# Patient Record
Sex: FEMALE | Race: WHITE | Employment: OTHER | URBAN - METROPOLITAN AREA
[De-identification: names, ages, dates, MRNs, and addresses within clinical notes are randomized per-mention and may not be internally consistent; named-entity substitution may affect disease eponyms.]

---

## 2018-08-16 ENCOUNTER — HOSPITAL ENCOUNTER (OUTPATIENT)
Age: 83
Discharge: HOME | End: 2018-08-18
Payer: MEDICARE

## 2018-08-16 LAB
BASOPHILS ABSOLUTE: 0.08 E9/L (ref 0–0.2)
BASOPHILS RELATIVE PERCENT: 1 % (ref 0–2)
EOSINOPHILS ABSOLUTE: 0.11 E9/L (ref 0.05–0.5)
EOSINOPHILS RELATIVE PERCENT: 1.3 % (ref 0–6)
FOLATE: 11.2 NG/ML (ref 4.8–24.2)
HCT VFR BLD CALC: 44.7 % (ref 34–48)
HEMOGLOBIN: 14.1 G/DL (ref 11.5–15.5)
IMMATURE GRANULOCYTES #: 0.02 E9/L
IMMATURE GRANULOCYTES %: 0.2 % (ref 0–5)
LYMPHOCYTES ABSOLUTE: 2.82 E9/L (ref 1.5–4)
LYMPHOCYTES RELATIVE PERCENT: 33.9 % (ref 20–42)
MCH RBC QN AUTO: 30.5 PG (ref 26–35)
MCHC RBC AUTO-ENTMCNC: 31.5 % (ref 32–34.5)
MCV RBC AUTO: 96.5 FL (ref 80–99.9)
MONOCYTES ABSOLUTE: 0.61 E9/L (ref 0.1–0.95)
MONOCYTES RELATIVE PERCENT: 7.3 % (ref 2–12)
NEUTROPHILS ABSOLUTE: 4.67 E9/L (ref 1.8–7.3)
NEUTROPHILS RELATIVE PERCENT: 56.3 % (ref 43–80)
PDW BLD-RTO: 12.6 FL (ref 11.5–15)
PLATELET # BLD: 239 E9/L (ref 130–450)
PMV BLD AUTO: 10.6 FL (ref 7–12)
RBC # BLD: 4.63 E12/L (ref 3.5–5.5)
T4 FREE: 1.4 NG/DL (ref 0.93–1.7)
TSH SERPL DL<=0.05 MIU/L-ACNC: 1.16 UIU/ML (ref 0.27–4.2)
VITAMIN B-12: 681 PG/ML (ref 211–946)
WBC # BLD: 8.3 E9/L (ref 4.5–11.5)

## 2018-08-16 PROCEDURE — 84439 ASSAY OF FREE THYROXINE: CPT

## 2018-08-16 PROCEDURE — 80061 LIPID PANEL: CPT

## 2018-08-16 PROCEDURE — 85025 COMPLETE CBC W/AUTO DIFF WBC: CPT

## 2018-08-16 PROCEDURE — 82746 ASSAY OF FOLIC ACID SERUM: CPT

## 2018-08-16 PROCEDURE — 84443 ASSAY THYROID STIM HORMONE: CPT

## 2018-08-16 PROCEDURE — 80053 COMPREHEN METABOLIC PANEL: CPT

## 2018-08-16 PROCEDURE — 82306 VITAMIN D 25 HYDROXY: CPT

## 2018-08-16 PROCEDURE — 82607 VITAMIN B-12: CPT

## 2018-08-17 LAB
ALBUMIN SERPL-MCNC: 4.4 G/DL (ref 3.5–5.2)
ALP BLD-CCNC: 68 U/L (ref 35–104)
ALT SERPL-CCNC: 9 U/L (ref 0–32)
ANION GAP SERPL CALCULATED.3IONS-SCNC: 15 MMOL/L (ref 7–16)
AST SERPL-CCNC: 25 U/L (ref 0–31)
BILIRUB SERPL-MCNC: 0.8 MG/DL (ref 0–1.2)
BUN BLDV-MCNC: 12 MG/DL (ref 8–23)
CALCIUM SERPL-MCNC: 9.6 MG/DL (ref 8.6–10.2)
CHLORIDE BLD-SCNC: 94 MMOL/L (ref 98–107)
CHOLESTEROL, TOTAL: 166 MG/DL (ref 0–199)
CO2: 32 MMOL/L (ref 22–29)
CREAT SERPL-MCNC: 0.8 MG/DL (ref 0.5–1)
GFR AFRICAN AMERICAN: >60
GFR NON-AFRICAN AMERICAN: >60 ML/MIN/1.73
GLUCOSE BLD-MCNC: 86 MG/DL (ref 74–109)
HDLC SERPL-MCNC: 60 MG/DL
LDL CHOLESTEROL CALCULATED: 78 MG/DL (ref 0–99)
POTASSIUM SERPL-SCNC: 4.4 MMOL/L (ref 3.5–5)
SODIUM BLD-SCNC: 141 MMOL/L (ref 132–146)
TOTAL PROTEIN: 7.4 G/DL (ref 6.4–8.3)
TRIGL SERPL-MCNC: 139 MG/DL (ref 0–149)
VITAMIN D 25-HYDROXY: 28 NG/ML (ref 30–100)
VLDLC SERPL CALC-MCNC: 28 MG/DL

## 2018-09-05 ENCOUNTER — ANESTHESIA (OUTPATIENT)
Dept: OPERATING ROOM | Age: 83
DRG: 470 | End: 2018-09-05
Payer: MEDICARE

## 2018-09-05 ENCOUNTER — APPOINTMENT (OUTPATIENT)
Dept: GENERAL RADIOLOGY | Age: 83
DRG: 470 | End: 2018-09-05
Payer: MEDICARE

## 2018-09-05 ENCOUNTER — ANESTHESIA EVENT (OUTPATIENT)
Dept: OPERATING ROOM | Age: 83
DRG: 470 | End: 2018-09-05
Payer: MEDICARE

## 2018-09-05 ENCOUNTER — HOSPITAL ENCOUNTER (INPATIENT)
Age: 83
LOS: 5 days | Discharge: NURSING, REHAB OR LONG-TERM CARE | DRG: 470 | End: 2018-09-10
Attending: EMERGENCY MEDICINE | Admitting: INTERNAL MEDICINE
Payer: MEDICARE

## 2018-09-05 DIAGNOSIS — S72.002A CLOSED FRACTURE OF LEFT HIP, INITIAL ENCOUNTER (HCC): Primary | ICD-10-CM

## 2018-09-05 PROBLEM — J92.9 PLEURAL PLAQUE WITHOUT ASBESTOS: Chronic | Status: ACTIVE | Noted: 2018-09-05

## 2018-09-05 PROBLEM — F02.80 LATE ONSET ALZHEIMER DISEASE (HCC): Chronic | Status: ACTIVE | Noted: 2018-09-05

## 2018-09-05 PROBLEM — G30.1 LATE ONSET ALZHEIMER DISEASE (HCC): Chronic | Status: ACTIVE | Noted: 2018-09-05

## 2018-09-05 PROBLEM — I25.10 CORONARY ARTERY DISEASE INVOLVING NATIVE CORONARY ARTERY: Chronic | Status: ACTIVE | Noted: 2018-09-05

## 2018-09-05 PROBLEM — M81.0 AGE RELATED OSTEOPOROSIS: Status: ACTIVE | Noted: 2018-09-05

## 2018-09-05 PROBLEM — E78.2 MIXED HYPERLIPIDEMIA: Chronic | Status: ACTIVE | Noted: 2018-09-05

## 2018-09-05 PROBLEM — I10 HYPERTENSION, UNCONTROLLED: Status: ACTIVE | Noted: 2018-09-05

## 2018-09-05 LAB
ABO/RH: NORMAL
ALBUMIN SERPL-MCNC: 3.9 G/DL (ref 3.5–5.2)
ALBUMIN SERPL-MCNC: 5 G/DL (ref 3.5–5.2)
ALP BLD-CCNC: 48 U/L (ref 35–104)
ALP BLD-CCNC: 64 U/L (ref 35–104)
ALT SERPL-CCNC: 16 U/L (ref 0–32)
ALT SERPL-CCNC: 21 U/L (ref 0–32)
ANION GAP SERPL CALCULATED.3IONS-SCNC: 11 MMOL/L (ref 7–16)
ANION GAP SERPL CALCULATED.3IONS-SCNC: 15 MMOL/L (ref 7–16)
ANTIBODY SCREEN: NORMAL
APTT: 29.7 SEC (ref 24.5–35.1)
AST SERPL-CCNC: 26 U/L (ref 0–31)
AST SERPL-CCNC: 31 U/L (ref 0–31)
BASOPHILS ABSOLUTE: 0.03 E9/L (ref 0–0.2)
BASOPHILS RELATIVE PERCENT: 0.2 % (ref 0–2)
BILIRUB SERPL-MCNC: 1.2 MG/DL (ref 0–1.2)
BILIRUB SERPL-MCNC: 1.3 MG/DL (ref 0–1.2)
BUN BLDV-MCNC: 16 MG/DL (ref 8–23)
BUN BLDV-MCNC: 17 MG/DL (ref 8–23)
CALCIUM SERPL-MCNC: 10.1 MG/DL (ref 8.6–10.2)
CALCIUM SERPL-MCNC: 9.1 MG/DL (ref 8.6–10.2)
CHLORIDE BLD-SCNC: 96 MMOL/L (ref 98–107)
CHLORIDE BLD-SCNC: 98 MMOL/L (ref 98–107)
CO2: 31 MMOL/L (ref 22–29)
CO2: 32 MMOL/L (ref 22–29)
CREAT SERPL-MCNC: 0.8 MG/DL (ref 0.5–1)
CREAT SERPL-MCNC: 0.8 MG/DL (ref 0.5–1)
EKG ATRIAL RATE: 88 BPM
EKG P-R INTERVAL: 144 MS
EKG Q-T INTERVAL: 368 MS
EKG QRS DURATION: 84 MS
EKG QTC CALCULATION (BAZETT): 445 MS
EKG R AXIS: 11 DEGREES
EKG T AXIS: 110 DEGREES
EKG VENTRICULAR RATE: 88 BPM
EOSINOPHILS ABSOLUTE: 0 E9/L (ref 0.05–0.5)
EOSINOPHILS RELATIVE PERCENT: 0 % (ref 0–6)
GFR AFRICAN AMERICAN: >60
GFR AFRICAN AMERICAN: >60
GFR NON-AFRICAN AMERICAN: >60 ML/MIN/1.73
GFR NON-AFRICAN AMERICAN: >60 ML/MIN/1.73
GLUCOSE BLD-MCNC: 150 MG/DL (ref 74–109)
GLUCOSE BLD-MCNC: 150 MG/DL (ref 74–109)
HCT VFR BLD CALC: 51.9 % (ref 34–48)
HEMOGLOBIN: 16.6 G/DL (ref 11.5–15.5)
IMMATURE GRANULOCYTES #: 0.09 E9/L
IMMATURE GRANULOCYTES %: 0.7 % (ref 0–5)
INR BLD: 1
LYMPHOCYTES ABSOLUTE: 1.09 E9/L (ref 1.5–4)
LYMPHOCYTES RELATIVE PERCENT: 7.9 % (ref 20–42)
MCH RBC QN AUTO: 30.8 PG (ref 26–35)
MCHC RBC AUTO-ENTMCNC: 32 % (ref 32–34.5)
MCV RBC AUTO: 96.3 FL (ref 80–99.9)
MONOCYTES ABSOLUTE: 0.62 E9/L (ref 0.1–0.95)
MONOCYTES RELATIVE PERCENT: 4.5 % (ref 2–12)
NEUTROPHILS ABSOLUTE: 11.98 E9/L (ref 1.8–7.3)
NEUTROPHILS RELATIVE PERCENT: 86.7 % (ref 43–80)
PDW BLD-RTO: 12.6 FL (ref 11.5–15)
PLATELET # BLD: 220 E9/L (ref 130–450)
PMV BLD AUTO: 9.5 FL (ref 7–12)
POTASSIUM SERPL-SCNC: 3.4 MMOL/L (ref 3.5–5)
POTASSIUM SERPL-SCNC: 4.2 MMOL/L (ref 3.5–5)
PROTHROMBIN TIME: 11.4 SEC (ref 9.3–12.4)
RBC # BLD: 5.39 E12/L (ref 3.5–5.5)
SODIUM BLD-SCNC: 141 MMOL/L (ref 132–146)
SODIUM BLD-SCNC: 142 MMOL/L (ref 132–146)
TOTAL PROTEIN: 6.4 G/DL (ref 6.4–8.3)
TOTAL PROTEIN: 8.4 G/DL (ref 6.4–8.3)
TROPONIN: <0.01 NG/ML (ref 0–0.03)
WBC # BLD: 13.8 E9/L (ref 4.5–11.5)

## 2018-09-05 PROCEDURE — 6370000000 HC RX 637 (ALT 250 FOR IP): Performed by: INTERNAL MEDICINE

## 2018-09-05 PROCEDURE — 84484 ASSAY OF TROPONIN QUANT: CPT

## 2018-09-05 PROCEDURE — 93005 ELECTROCARDIOGRAM TRACING: CPT | Performed by: EMERGENCY MEDICINE

## 2018-09-05 PROCEDURE — 73502 X-RAY EXAM HIP UNI 2-3 VIEWS: CPT

## 2018-09-05 PROCEDURE — 85610 PROTHROMBIN TIME: CPT

## 2018-09-05 PROCEDURE — 73552 X-RAY EXAM OF FEMUR 2/>: CPT

## 2018-09-05 PROCEDURE — 36415 COLL VENOUS BLD VENIPUNCTURE: CPT

## 2018-09-05 PROCEDURE — 6360000002 HC RX W HCPCS: Performed by: EMERGENCY MEDICINE

## 2018-09-05 PROCEDURE — 85025 COMPLETE CBC W/AUTO DIFF WBC: CPT

## 2018-09-05 PROCEDURE — 2500000003 HC RX 250 WO HCPCS: Performed by: EMERGENCY MEDICINE

## 2018-09-05 PROCEDURE — 99285 EMERGENCY DEPT VISIT HI MDM: CPT

## 2018-09-05 PROCEDURE — 0HBRXZZ EXCISION OF TOE NAIL, EXTERNAL APPROACH: ICD-10-PCS | Performed by: PODIATRIST

## 2018-09-05 PROCEDURE — 2580000003 HC RX 258: Performed by: INTERNAL MEDICINE

## 2018-09-05 PROCEDURE — 85730 THROMBOPLASTIN TIME PARTIAL: CPT

## 2018-09-05 PROCEDURE — 1200000000 HC SEMI PRIVATE

## 2018-09-05 PROCEDURE — 86900 BLOOD TYPING SEROLOGIC ABO: CPT

## 2018-09-05 PROCEDURE — 80053 COMPREHEN METABOLIC PANEL: CPT

## 2018-09-05 PROCEDURE — 86850 RBC ANTIBODY SCREEN: CPT

## 2018-09-05 PROCEDURE — 71045 X-RAY EXAM CHEST 1 VIEW: CPT

## 2018-09-05 PROCEDURE — 86901 BLOOD TYPING SEROLOGIC RH(D): CPT

## 2018-09-05 PROCEDURE — 2500000003 HC RX 250 WO HCPCS: Performed by: INTERNAL MEDICINE

## 2018-09-05 RX ORDER — LOSARTAN POTASSIUM 50 MG/1
25 TABLET ORAL DAILY
Status: DISCONTINUED | OUTPATIENT
Start: 2018-09-05 | End: 2018-09-10 | Stop reason: HOSPADM

## 2018-09-05 RX ORDER — METOPROLOL TARTRATE 50 MG/1
50 TABLET, FILM COATED ORAL 2 TIMES DAILY
Status: DISCONTINUED | OUTPATIENT
Start: 2018-09-05 | End: 2018-09-10 | Stop reason: HOSPADM

## 2018-09-05 RX ORDER — SODIUM CHLORIDE 0.9 % (FLUSH) 0.9 %
10 SYRINGE (ML) INJECTION PRN
Status: DISCONTINUED | OUTPATIENT
Start: 2018-09-05 | End: 2018-09-05 | Stop reason: SDUPTHER

## 2018-09-05 RX ORDER — DEXTROSE, SODIUM CHLORIDE, AND POTASSIUM CHLORIDE 5; .45; .15 G/100ML; G/100ML; G/100ML
INJECTION INTRAVENOUS CONTINUOUS
Status: DISCONTINUED | OUTPATIENT
Start: 2018-09-05 | End: 2018-09-07

## 2018-09-05 RX ORDER — ACETAMINOPHEN 325 MG/1
650 TABLET ORAL EVERY 4 HOURS PRN
Status: DISCONTINUED | OUTPATIENT
Start: 2018-09-05 | End: 2018-09-06 | Stop reason: SDUPTHER

## 2018-09-05 RX ORDER — DONEPEZIL HYDROCHLORIDE 5 MG/1
5 TABLET, ORALLY DISINTEGRATING ORAL NIGHTLY
Status: DISCONTINUED | OUTPATIENT
Start: 2018-09-05 | End: 2018-09-10 | Stop reason: HOSPADM

## 2018-09-05 RX ORDER — METOPROLOL TARTRATE 5 MG/5ML
5 INJECTION INTRAVENOUS ONCE
Status: COMPLETED | OUTPATIENT
Start: 2018-09-05 | End: 2018-09-05

## 2018-09-05 RX ORDER — LOVASTATIN 20 MG/1
20 TABLET ORAL NIGHTLY
COMMUNITY

## 2018-09-05 RX ORDER — OXYCODONE HYDROCHLORIDE 5 MG/1
5 TABLET ORAL EVERY 4 HOURS PRN
Status: DISCONTINUED | OUTPATIENT
Start: 2018-09-05 | End: 2018-09-10 | Stop reason: HOSPADM

## 2018-09-05 RX ORDER — SODIUM CHLORIDE 0.9 % (FLUSH) 0.9 %
10 SYRINGE (ML) INJECTION PRN
Status: DISCONTINUED | OUTPATIENT
Start: 2018-09-05 | End: 2018-09-06 | Stop reason: SDUPTHER

## 2018-09-05 RX ORDER — SODIUM CHLORIDE 0.9 % (FLUSH) 0.9 %
10 SYRINGE (ML) INJECTION EVERY 12 HOURS SCHEDULED
Status: DISCONTINUED | OUTPATIENT
Start: 2018-09-05 | End: 2018-09-06 | Stop reason: SDUPTHER

## 2018-09-05 RX ORDER — OXYCODONE HYDROCHLORIDE AND ACETAMINOPHEN 5; 325 MG/1; MG/1
2 TABLET ORAL EVERY 4 HOURS PRN
Status: DISCONTINUED | OUTPATIENT
Start: 2018-09-05 | End: 2018-09-05

## 2018-09-05 RX ORDER — DONEPEZIL HYDROCHLORIDE 5 MG/1
5 TABLET, ORALLY DISINTEGRATING ORAL NIGHTLY
COMMUNITY

## 2018-09-05 RX ORDER — LOSARTAN POTASSIUM AND HYDROCHLOROTHIAZIDE 12.5; 5 MG/1; MG/1
0.5 TABLET ORAL DAILY
Status: DISCONTINUED | OUTPATIENT
Start: 2018-09-05 | End: 2018-09-05 | Stop reason: CLARIF

## 2018-09-05 RX ORDER — SODIUM CHLORIDE 0.9 % (FLUSH) 0.9 %
10 SYRINGE (ML) INJECTION EVERY 12 HOURS SCHEDULED
Status: DISCONTINUED | OUTPATIENT
Start: 2018-09-05 | End: 2018-09-05 | Stop reason: SDUPTHER

## 2018-09-05 RX ORDER — MORPHINE SULFATE 4 MG/ML
4 INJECTION, SOLUTION INTRAMUSCULAR; INTRAVENOUS EVERY 4 HOURS PRN
Status: DISCONTINUED | OUTPATIENT
Start: 2018-09-05 | End: 2018-09-10 | Stop reason: HOSPADM

## 2018-09-05 RX ORDER — AMLODIPINE BESYLATE 5 MG/1
5 TABLET ORAL DAILY
Status: DISCONTINUED | OUTPATIENT
Start: 2018-09-05 | End: 2018-09-10 | Stop reason: HOSPADM

## 2018-09-05 RX ORDER — OXYCODONE HYDROCHLORIDE 5 MG/1
10 TABLET ORAL EVERY 4 HOURS PRN
Status: DISCONTINUED | OUTPATIENT
Start: 2018-09-05 | End: 2018-09-10 | Stop reason: HOSPADM

## 2018-09-05 RX ORDER — MORPHINE SULFATE 2 MG/ML
2 INJECTION, SOLUTION INTRAMUSCULAR; INTRAVENOUS EVERY 4 HOURS PRN
Status: DISCONTINUED | OUTPATIENT
Start: 2018-09-05 | End: 2018-09-10 | Stop reason: HOSPADM

## 2018-09-05 RX ORDER — METOPROLOL TARTRATE 50 MG/1
50 TABLET, FILM COATED ORAL 2 TIMES DAILY
COMMUNITY

## 2018-09-05 RX ORDER — FUROSEMIDE 20 MG/1
20 TABLET ORAL DAILY
Status: ON HOLD | COMMUNITY
End: 2018-09-10 | Stop reason: HOSPADM

## 2018-09-05 RX ORDER — FUROSEMIDE 20 MG/1
20 TABLET ORAL DAILY
COMMUNITY
End: 2018-09-05 | Stop reason: ALTCHOICE

## 2018-09-05 RX ORDER — LOSARTAN POTASSIUM AND HYDROCHLOROTHIAZIDE 12.5; 5 MG/1; MG/1
0.5 TABLET ORAL DAILY
COMMUNITY

## 2018-09-05 RX ORDER — OXYCODONE HYDROCHLORIDE AND ACETAMINOPHEN 5; 325 MG/1; MG/1
1 TABLET ORAL EVERY 4 HOURS PRN
Status: DISCONTINUED | OUTPATIENT
Start: 2018-09-05 | End: 2018-09-05

## 2018-09-05 RX ORDER — SIMVASTATIN 10 MG
5 TABLET ORAL NIGHTLY
Status: DISCONTINUED | OUTPATIENT
Start: 2018-09-05 | End: 2018-09-10 | Stop reason: HOSPADM

## 2018-09-05 RX ORDER — FENTANYL CITRATE 50 UG/ML
50 INJECTION, SOLUTION INTRAMUSCULAR; INTRAVENOUS ONCE
Status: COMPLETED | OUTPATIENT
Start: 2018-09-05 | End: 2018-09-05

## 2018-09-05 RX ORDER — HYDROCHLOROTHIAZIDE 12.5 MG/1
6.25 TABLET ORAL DAILY
Status: DISCONTINUED | OUTPATIENT
Start: 2018-09-05 | End: 2018-09-10 | Stop reason: HOSPADM

## 2018-09-05 RX ORDER — ONDANSETRON 2 MG/ML
4 INJECTION INTRAMUSCULAR; INTRAVENOUS EVERY 6 HOURS PRN
Status: DISCONTINUED | OUTPATIENT
Start: 2018-09-05 | End: 2018-09-10 | Stop reason: HOSPADM

## 2018-09-05 RX ADMIN — Medication 10 ML: at 10:05

## 2018-09-05 RX ADMIN — HYDROCHLOROTHIAZIDE 6.25 MG: 12.5 TABLET ORAL at 10:02

## 2018-09-05 RX ADMIN — SIMVASTATIN 5 MG: 10 TABLET, FILM COATED ORAL at 21:17

## 2018-09-05 RX ADMIN — POTASSIUM CHLORIDE, DEXTROSE MONOHYDRATE AND SODIUM CHLORIDE: 150; 5; 450 INJECTION, SOLUTION INTRAVENOUS at 10:06

## 2018-09-05 RX ADMIN — METOPROLOL TARTRATE 50 MG: 50 TABLET ORAL at 10:04

## 2018-09-05 RX ADMIN — AMLODIPINE BESYLATE 5 MG: 5 TABLET ORAL at 10:05

## 2018-09-05 RX ADMIN — FENTANYL CITRATE 50 MCG: 50 INJECTION INTRAMUSCULAR; INTRAVENOUS at 06:22

## 2018-09-05 RX ADMIN — Medication 10 ML: at 21:18

## 2018-09-05 RX ADMIN — METOPROLOL TARTRATE 5 MG: 5 INJECTION, SOLUTION INTRAVENOUS at 07:30

## 2018-09-05 RX ADMIN — METOPROLOL TARTRATE 50 MG: 50 TABLET ORAL at 21:17

## 2018-09-05 RX ADMIN — DONEPEZIL HYDROCHLORIDE 5 MG: 5 TABLET, ORALLY DISINTEGRATING ORAL at 21:17

## 2018-09-05 RX ADMIN — LOSARTAN POTASSIUM 25 MG: 50 TABLET, FILM COATED ORAL at 10:04

## 2018-09-05 ASSESSMENT — PAIN DESCRIPTION - FREQUENCY
FREQUENCY: CONTINUOUS
FREQUENCY: CONTINUOUS

## 2018-09-05 ASSESSMENT — ENCOUNTER SYMPTOMS
EYE DISCHARGE: 0
NAUSEA: 0
EYE PAIN: 0
EYE REDNESS: 0
VOMITING: 0
WHEEZING: 0
ABDOMINAL DISTENTION: 0
SORE THROAT: 0
DIARRHEA: 0
SINUS PRESSURE: 0
BACK PAIN: 0
SHORTNESS OF BREATH: 0
COUGH: 0

## 2018-09-05 ASSESSMENT — PAIN DESCRIPTION - PAIN TYPE
TYPE: ACUTE PAIN
TYPE: ACUTE PAIN

## 2018-09-05 ASSESSMENT — PAIN SCALES - GENERAL
PAINLEVEL_OUTOF10: 8
PAINLEVEL_OUTOF10: 0
PAINLEVEL_OUTOF10: 2
PAINLEVEL_OUTOF10: 0

## 2018-09-05 ASSESSMENT — PAIN DESCRIPTION - ORIENTATION
ORIENTATION: LEFT
ORIENTATION: LEFT

## 2018-09-05 ASSESSMENT — PAIN DESCRIPTION - LOCATION
LOCATION: LEG;HIP
LOCATION: LEG;HIP

## 2018-09-05 ASSESSMENT — PAIN DESCRIPTION - DESCRIPTORS
DESCRIPTORS: THROBBING
DESCRIPTORS: DULL;ACHING

## 2018-09-05 ASSESSMENT — LIFESTYLE VARIABLES: SMOKING_STATUS: 1

## 2018-09-05 NOTE — ED PROVIDER NOTES
Patient is an 79 y/o female who presents to the ED via EMS after a fall at home. Patient states she was looking out of her window this morning and when she turned she lost her balance and fell to the floor. She denies hitting her head or any loss of consciousness. She denies any dizziness or lightheadedness. Since the fall she has had 8/10 pain in her left hip. She denies any other injury. The history is provided by the patient. Review of Systems   Constitutional: Negative for chills and fever. HENT: Negative for ear pain, sinus pressure and sore throat. Eyes: Negative for pain, discharge and redness. Respiratory: Negative for cough, shortness of breath and wheezing. Cardiovascular: Negative for chest pain. Gastrointestinal: Negative for abdominal distention, diarrhea, nausea and vomiting. Genitourinary: Negative for dysuria and frequency. Musculoskeletal: Positive for arthralgias. Negative for back pain. Skin: Negative for rash and wound. Neurological: Negative for weakness and headaches. Hematological: Negative for adenopathy. All other systems reviewed and are negative. Physical Exam   Constitutional: She is oriented to person, place, and time. She appears well-developed and well-nourished. No distress. HENT:   Head: Normocephalic and atraumatic. Right Ear: External ear normal.   Left Ear: External ear normal.   Nose: Nose normal.   Mouth/Throat: Oropharynx is clear and moist.   Eyes: Pupils are equal, round, and reactive to light. Conjunctivae are normal.   Neck: Normal range of motion. Neck supple. Cardiovascular: Normal rate, regular rhythm, normal heart sounds and intact distal pulses. No murmur heard. Pulmonary/Chest: Effort normal and breath sounds normal. No respiratory distress. She has no wheezes. She has no rales. Abdominal: Soft. Bowel sounds are normal. She exhibits no distension. There is no tenderness. There is no rebound and no guarding. Oxygen Saturation Interpretation: Normal    ------------------------------------------ PROGRESS NOTES ------------------------------------------  Re-evaluation(s):  Time: 0796. Patients symptoms show no change  Repeat physical examination is not changed    Counseling:  I have spoken with the patient and discussed todays results, in addition to providing specific details for the plan of care and counseling regarding the diagnosis and prognosis. Their questions are answered at this time and they are agreeable with the plan of admission.    --------------------------------- ADDITIONAL PROVIDER NOTES ---------------------------------  Consultations:  Time: 3300. Spoke with Dr. Gavin Sampson. Discussed case. They will admit the patient. This patient's ED course included: a personal history and physicial examination, re-evaluation prior to disposition, multiple bedside re-evaluations and IV medications    This patient has remained hemodynamically stable during their ED course. Diagnosis:  1. Closed fracture of left hip, initial encounter [facility])        Disposition:  Patient's disposition: Admit to med/surg floor  Patient's condition is stable.              [address] [facility],   09/05/18 2180

## 2018-09-05 NOTE — CONSULTS
Department of Orthopedic Surgery  Resident Consult Note          Reason for Consult:  L hip pain    HISTORY OF PRESENT ILLNESS:       Patient is a 80 y.o. female who presents with left hip pain status post fall 1 AM this morning. Patient was standing at a window, turn and fell. Denies any loss of consciousness or head trauma. Pt felt immediate pain to left hip, throbbing in nature, constant, worse with movement of left lower extremity, made better by rest. Denies any other pain. Patient lives at home with son, ambulates without aid, independent to all ADLs. Patient does not take anticoagulants. Medical history significant for hypertension. Denies numbness/tingling/paresthesias. Denies any other orthopedic complaints at this time. Past Medical History:        Diagnosis Date    Hypertension      Past Surgical History:        Procedure Laterality Date    HYSTERECTOMY       Current Medications:   No current facility-administered medications for this encounter. Allergies:  Patient has no known allergies. Social History:   TOBACCO:   reports that she has been smoking Cigarettes. She has been smoking about 1.00 pack per day. She has never used smokeless tobacco.  ETOH:   reports that she does not drink alcohol. DRUGS:   reports that she does not use drugs. ACTIVITIES OF DAILY LIVING:    OCCUPATION:    Family History:   History reviewed. No pertinent family history.     REVIEW OF SYSTEMS:  CONSTITUTIONAL:  negative for  fevers, chills  EYES:  negative for blurred vision, visual disturbance  HEENT:  negative for  hearing loss, voice change  RESPIRATORY:  negative for  dyspnea, wheezing  CARDIOVASCULAR:  negative for  chest pain, palpitations  GASTROINTESTINAL:  negative for nausea, vomiting  GENITOURINARY:  negative for frequency, urinary incontinence  HEMATOLOGIC/LYMPHATIC:  negative for bleeding and petechiae  MUSCULOSKELETAL:  positive for  pain  NEUROLOGICAL:  negative for headaches, dizziness  BEHAVIOR/PSYCH:  negative for increased agitation and anxiety    PHYSICAL EXAM:    VITALS:  BP (!) 184/116   Pulse 91   Temp 99.2 °F (37.3 °C) (Oral)   Resp 20   Ht 5' 2\" (1.575 m)   Wt 115 lb (52.2 kg)   SpO2 95%   BMI 21.03 kg/m²   CONSTITUTIONAL:  awake, alert, cooperative, no apparent distress, and appears stated age  MUSCULOSKELETAL:  Left lower Extremity:  Skin is intact circumferentially  +TTP  At L hip. -TTP at knee, ankle, and foot. Compartments soft and compressible  Palpable dorsalis pedis and posterior tibialis pulse, brisk cap refill to toes, foot warm and perfused  Sensation intact to light touch in sural/deep peroneal/superficial peroneal/saphenous/posterior tibial nerve distributions to foot/ankle  Demonstrates active ankle plantar/dorsiflexion/great toe extension  2+ pitting edema present at foot and ankle  Secondary Exam:   · bilateralUE: Abrasions covered by dressing at L forearm. -TTP to fingers, hand, wrist, forearm, elbow, humerus, shoulder or clavicle. -- Patient able to flex/extend fingers, wrist, elbow and shoulder with active and passive ROM without pain, +2/4 Radial pulse, cap refill <3sec, +AIN/PIN/Radial/Ulnar/Median N, distal sensation grossly intact to C4-T1 dermatomes, compartments soft and compressible. · rightLE: No obvious signs of trauma. -TTP to foot, ankle, leg, knee, thigh, hip.-- Patient able to flex/extend toes, ankle, knee and hip with active and passive ROM without pain,+2/4 DP & PT pulses, cap refill <3sec, +5/5 PF/DF/EHL, distal sensation grossly intact to L4-S1 dermatomes, compartments soft and compressible.  2+ pitting edema at foot and ankle    · Pelvis: -TTP, +Log roll, -Heel strike     DATA:    CBC:   Lab Results   Component Value Date    WBC 13.8 09/05/2018    RBC 5.39 09/05/2018    HGB 16.6 09/05/2018    HCT 51.9 09/05/2018    MCV 96.3 09/05/2018    MCH 30.8 09/05/2018    MCHC 32.0 09/05/2018    RDW 12.6 09/05/2018     09/05/2018    HARJINDER

## 2018-09-05 NOTE — ANESTHESIA PRE PROCEDURE
(MILK OF MAGNESIA) 400 MG/5ML suspension 30 mL  30 mL Oral Daily PRN Adbirahman Ramirez MD        ondansetron [facility]) injection 4 mg  4 mg Intravenous Q6H PRN Abdirahman Ramirez MD        enoxaparin (LOVENOX) injection 40 mg  40 mg Subcutaneous Daily Abdirahman Ramirez MD   Stopped at 09/05/18 1005    dextrose 5 % and 0.45 % NaCl with KCl 20 mEq infusion   Intravenous Continuous Abdirahman Ramirez  mL/hr at 09/05/18 1006      losartan (COZAAR) tablet 25 mg  25 mg Oral Daily Alvaro Chapin MD   25 mg at 09/06/18 2534    And    hydrochlorothiazide (HYDRODIURIL) tablet 6.25 mg  6.25 mg Oral Daily Alvaro Chapin MD   6.25 mg at 09/05/18 1002    oxyCODONE (ROXICODONE) immediate release tablet 5 mg  5 mg Oral Q4H PRN Renford Swift, DO        Or    oxyCODONE (ROXICODONE) immediate release tablet 10 mg  10 mg Oral Q4H PRN Renford Swift, DO        morphine (PF) injection 2 mg  2 mg Intravenous Q4H PRN Renford Swift, DO        Or    morphine (PF) injection 4 mg  4 mg Intravenous Q4H PRN Renford Swift, DO           Allergies:  No Known Allergies    Problem List:    Patient Active Problem List   Diagnosis Code    Closed left hip fracture, initial encounter ([facility] [address] S72.002A    Late onset Alzheimer disease G30.1, F02.80    Coronary artery disease involving native coronary artery I25.10    Hypertension, uncontrolled I10    Mixed hyperlipidemia E78.2    Age related osteoporosis M81.0    Pleural plaque without asbestos J92.9       Past Medical History:        Diagnosis Date    Coronary artery disease involving native coronary artery 9/5/2018    Dementia     Hypertension     Mixed hyperlipidemia 9/5/2018       Past Surgical History:        Procedure Laterality Date    HYSTERECTOMY         Social History:    Social History   Substance Use Topics    Smoking status: Current Every Day Smoker     Packs/day: 1.00     Types: Cigarettes    Smokeless tobacco: Never Used    Alcohol use No Ready to quit: Not Answered  Counseling given: Not Answered      Vital Signs (Current):   Vitals:    09/05/18 2100 09/06/18 0000 09/06/18 0630 09/06/18 0821   BP: (!) 148/69   (!) 150/86   Pulse: 83   80   Resp: 14   16   Temp: 97.8 °F (36.6 °C)   98.9 °F (37.2 °C)   TempSrc: Oral   Oral   SpO2: 97% 96%     Weight:   114 lb 8 oz (51.9 kg)    Height:                                                  BP Readings from Last 3 Encounters:   09/06/18 (!) 150/86       NPO Status:                                                                                 BMI:   Wt Readings from Last 3 Encounters:   09/06/18 114 lb 8 oz (51.9 kg)     Body mass index is 20.94 kg/m². CBC:   Lab Results   Component Value Date    WBC 14.1 09/06/2018    RBC 4.33 09/06/2018    HGB 13.6 09/06/2018    HCT 41.4 09/06/2018    MCV 95.6 09/06/2018    RDW 12.8 09/06/2018     09/06/2018       CMP:   Lab Results   Component Value Date     09/05/2018    K 3.4 09/05/2018    CL 98 09/05/2018    CO2 32 09/05/2018    BUN 16 09/05/2018    CREATININE 0.8 09/05/2018    GFRAA >60 09/05/2018    LABGLOM >60 09/05/2018    GLUCOSE 150 09/05/2018    GLUCOSE 83 04/20/2012    PROT 6.4 09/05/2018    CALCIUM 9.1 09/05/2018    BILITOT 1.3 09/05/2018    ALKPHOS 48 09/05/2018    AST 26 09/05/2018    ALT 16 09/05/2018       POC Tests: No results for input(s): POCGLU, POCNA, POCK, POCCL, POCBUN, POCHEMO, POCHCT in the last 72 hours.     Coags:   Lab Results   Component Value Date    PROTIME 11.4 09/05/2018    INR 1.0 09/05/2018    APTT 29.7 09/05/2018       HCG (If Applicable): No results found for: PREGTESTUR, PREGSERUM, HCG, HCGQUANT     ABGs: No results found for: PHART, PO2ART, FVI0YDF, DXG5ZWN, BEART, E5PXWPIK     Type & Screen (If Applicable):  No results found for: [facility]    Anesthesia Evaluation  Patient summary reviewed no history of anesthetic complications:   Airway: Mallampati: III  TM distance: >3 FB   Neck ROM: full   Dental:    (+) edentulous      Pulmonary: breath sounds clear to auscultation  (+) current smoker                           Cardiovascular:    (+) hypertension:, CAD:, hyperlipidemia      ECG reviewed  Rhythm: regular  Rate: normal           Beta Blocker:  Dose within 24 Hrs      ROS comment: Denies active cardiac sx     Neuro/Psych:   (+) psychiatric history:             ROS comment: dementia GI/Hepatic/Renal: Neg GI/Hepatic/Renal ROS            Endo/Other: Negative Endo/Other ROS                    Abdominal:           Vascular:                                    Anesthesia Plan      spinal and general     ASA 3     (Dementia. GA vs spinal discussed with pt and POA. Agrees to proceed with spinal, with GA as backup.)  Induction: intravenous. MIPS: Postoperative opioids intended. Anesthetic plan and risks discussed with patient and healthcare power of . Plan discussed with CRNA.               Seamus House MD   9/6/2018

## 2018-09-05 NOTE — H&P
Patient ID:  Gal Castillo  [id number]  80 y.o.  12/21/1932    Chief Complaint: Patient fell while turning around looking outside window well between window and the TV screen, patient hit her left lower part, denied any head injury, denied any dizziness prior to the episode. Patient has previous history of mild coronary artery disease, Alzheimer's dementia, hypertension, noncompliant has not been to the office for more than a year in past was seen couple weeks ago with elevated blood pressure    History of present illness:  Patient is a 80 y.o. fell at home trying to turn around looking outside the window, known history of hypertension, hyperlipidemia, mild coronary artery disease, tobacco abuse. Patient Active Problem List   Diagnosis    Closed left hip fracture, initial encounter [facility])     Past Medical History:   Diagnosis Date    Hypertension       Past Surgical History:   Procedure Laterality Date    HYSTERECTOMY        Not in a hospital admission. No Known Allergies   Social History   Substance Use Topics    Smoking status: Current Every Day Smoker     Packs/day: 1.00     Types: Cigarettes    Smokeless tobacco: Never Used    Alcohol use No      History reviewed. No pertinent family history.      Review of systems:  Constitutional:  Denies fever or chills   Eyes:  Denies change in visual acuity   HENT:  Denies nasal congestion or sore throat   Respiratory:  Denies cough or shortness of breath   Cardiovascular:  Denies chest pain or edema   GI:  Denies abdominal pain, nausea, vomiting, bloody stools or diarrhea   :  Denies dysuria   Musculoskeletal:  Denies back pain or joint pain   Integument:  Denies rash   Neurologic:  Denies headache, focal weakness or sensory changes   Endocrine:  Denies polyuria or polydipsia   Lymphatic:  Denies swollen glands   Psychiatric:  Denies depression or anxiety     Physical Exam:  BP (!) 184/116   Pulse 91   Temp 99.2 °F (37.3 °C) (Oral)   Resp 20   Ht 5' 2\" (1.575 m)   Wt 115 lb (52.2 kg)   SpO2 95%   BMI 21.03 kg/m²   No intake/output data recorded. Constitutional:  Well developed, well nourished, no acute distress, non-toxic appearance   Eyes:  PERRL, conjunctiva normal   HENT:  Atraumatic, external ears normal, nose normal, oropharynx moist, no pharyngeal exudates.  Neck- normal range of motion, no tenderness, supple   Respiratory:  No respiratory distress, normal breath sounds, no rales, no wheezing   Cardiovascular:  Normal rate, normal rhythm, no murmurs, no gallops, no rubs   GI:  Soft, nondistended, normal bowel sounds, nontender, no organomegaly, no mass, no rebound, no guarding   :  No costovertebral angle tenderness   Musculoskeletal:  Complains of left hip pain Integument:  Well hydrated, no rash   Lymphatic:  No lymphadenopathy noted   Neurologic:  Alert & oriented x 3, CN 2-12 normal, normal motor function, normal sensory function, no focal deficits noted   Psychiatric:  Speech and behavior appropriate   Patient has history of Alzheimer's dementia    Labs:  CBC:   Lab Results   Component Value Date    WBC 13.8 09/05/2018    RBC 5.39 09/05/2018    HGB 16.6 09/05/2018    HCT 51.9 09/05/2018    MCV 96.3 09/05/2018    MCH 30.8 09/05/2018    MCHC 32.0 09/05/2018    RDW 12.6 09/05/2018     09/05/2018    MPV 9.5 09/05/2018     CMP:    Lab Results   Component Value Date     09/05/2018    K 4.2 09/05/2018    CL 96 09/05/2018    CO2 31 09/05/2018    BUN 17 09/05/2018    CREATININE 0.8 09/05/2018    GFRAA >60 09/05/2018    LABGLOM >60 09/05/2018    GLUCOSE 150 09/05/2018    GLUCOSE 83 04/20/2012    PROT 8.4 09/05/2018    LABALBU 5.0 09/05/2018    LABALBU 4.3 04/20/2012    CALCIUM 10.1 09/05/2018    BILITOT 1.2 09/05/2018    ALKPHOS 64 09/05/2018    AST 31 09/05/2018    ALT 21 09/05/2018     PT/INR:    Lab Results   Component Value Date    PROTIME 11.4 09/05/2018    INR 1.0 09/05/2018       Xr Hip Left (2-3 Views)    Result Date: 9/5/2018  Location: 200

## 2018-09-06 ENCOUNTER — APPOINTMENT (OUTPATIENT)
Dept: GENERAL RADIOLOGY | Age: 83
DRG: 470 | End: 2018-09-06
Payer: MEDICARE

## 2018-09-06 VITALS
DIASTOLIC BLOOD PRESSURE: 60 MMHG | RESPIRATION RATE: 12 BRPM | OXYGEN SATURATION: 100 % | SYSTOLIC BLOOD PRESSURE: 156 MMHG | TEMPERATURE: 94.1 F

## 2018-09-06 LAB
HCT VFR BLD CALC: 41.4 % (ref 34–48)
HEMOGLOBIN: 13.6 G/DL (ref 11.5–15.5)
MCH RBC QN AUTO: 31.4 PG (ref 26–35)
MCHC RBC AUTO-ENTMCNC: 32.9 % (ref 32–34.5)
MCV RBC AUTO: 95.6 FL (ref 80–99.9)
PDW BLD-RTO: 12.8 FL (ref 11.5–15)
PLATELET # BLD: 169 E9/L (ref 130–450)
PMV BLD AUTO: 9.8 FL (ref 7–12)
RBC # BLD: 4.33 E12/L (ref 3.5–5.5)
WBC # BLD: 14.1 E9/L (ref 4.5–11.5)

## 2018-09-06 PROCEDURE — 6360000002 HC RX W HCPCS: Performed by: ORTHOPAEDIC SURGERY

## 2018-09-06 PROCEDURE — 97530 THERAPEUTIC ACTIVITIES: CPT | Performed by: PHYSICAL THERAPIST

## 2018-09-06 PROCEDURE — G8988 SELF CARE GOAL STATUS: HCPCS

## 2018-09-06 PROCEDURE — 97161 PT EVAL LOW COMPLEX 20 MIN: CPT | Performed by: PHYSICAL THERAPIST

## 2018-09-06 PROCEDURE — 6360000002 HC RX W HCPCS: Performed by: NURSE ANESTHETIST, CERTIFIED REGISTERED

## 2018-09-06 PROCEDURE — 88305 TISSUE EXAM BY PATHOLOGIST: CPT

## 2018-09-06 PROCEDURE — 6360000002 HC RX W HCPCS: Performed by: ANESTHESIOLOGY

## 2018-09-06 PROCEDURE — G8979 MOBILITY GOAL STATUS: HCPCS | Performed by: PHYSICAL THERAPIST

## 2018-09-06 PROCEDURE — 36415 COLL VENOUS BLD VENIPUNCTURE: CPT

## 2018-09-06 PROCEDURE — 73502 X-RAY EXAM HIP UNI 2-3 VIEWS: CPT

## 2018-09-06 PROCEDURE — G8987 SELF CARE CURRENT STATUS: HCPCS

## 2018-09-06 PROCEDURE — C1776 JOINT DEVICE (IMPLANTABLE): HCPCS | Performed by: ORTHOPAEDIC SURGERY

## 2018-09-06 PROCEDURE — G8978 MOBILITY CURRENT STATUS: HCPCS | Performed by: PHYSICAL THERAPIST

## 2018-09-06 PROCEDURE — 97165 OT EVAL LOW COMPLEX 30 MIN: CPT

## 2018-09-06 PROCEDURE — 3600000005 HC SURGERY LEVEL 5 BASE: Performed by: ORTHOPAEDIC SURGERY

## 2018-09-06 PROCEDURE — 6370000000 HC RX 637 (ALT 250 FOR IP): Performed by: INTERNAL MEDICINE

## 2018-09-06 PROCEDURE — 3600000015 HC SURGERY LEVEL 5 ADDTL 15MIN: Performed by: ORTHOPAEDIC SURGERY

## 2018-09-06 PROCEDURE — 3700000001 HC ADD 15 MINUTES (ANESTHESIA): Performed by: ORTHOPAEDIC SURGERY

## 2018-09-06 PROCEDURE — 2580000003 HC RX 258: Performed by: ORTHOPAEDIC SURGERY

## 2018-09-06 PROCEDURE — 85027 COMPLETE CBC AUTOMATED: CPT

## 2018-09-06 PROCEDURE — 2709999900 HC NON-CHARGEABLE SUPPLY: Performed by: ORTHOPAEDIC SURGERY

## 2018-09-06 PROCEDURE — 88311 DECALCIFY TISSUE: CPT

## 2018-09-06 PROCEDURE — 0SRS0JZ REPLACEMENT OF LEFT HIP JOINT, FEMORAL SURFACE WITH SYNTHETIC SUBSTITUTE, OPEN APPROACH: ICD-10-PCS | Performed by: ORTHOPAEDIC SURGERY

## 2018-09-06 PROCEDURE — 1200000000 HC SEMI PRIVATE

## 2018-09-06 PROCEDURE — 2500000003 HC RX 250 WO HCPCS: Performed by: NURSE ANESTHETIST, CERTIFIED REGISTERED

## 2018-09-06 PROCEDURE — 97530 THERAPEUTIC ACTIVITIES: CPT

## 2018-09-06 PROCEDURE — 7100000001 HC PACU RECOVERY - ADDTL 15 MIN: Performed by: ORTHOPAEDIC SURGERY

## 2018-09-06 PROCEDURE — 7100000000 HC PACU RECOVERY - FIRST 15 MIN: Performed by: ORTHOPAEDIC SURGERY

## 2018-09-06 PROCEDURE — 6370000000 HC RX 637 (ALT 250 FOR IP): Performed by: ORTHOPAEDIC SURGERY

## 2018-09-06 PROCEDURE — 2580000003 HC RX 258: Performed by: NURSE ANESTHETIST, CERTIFIED REGISTERED

## 2018-09-06 PROCEDURE — 2500000003 HC RX 250 WO HCPCS: Performed by: ANESTHESIOLOGY

## 2018-09-06 PROCEDURE — 3700000000 HC ANESTHESIA ATTENDED CARE: Performed by: ORTHOPAEDIC SURGERY

## 2018-09-06 DEVICE — COMPONENT TOT HIP CAPPED PRIMARY CEM STEM SUMMIT: Type: IMPLANTABLE DEVICE | Site: HIP | Status: FUNCTIONAL

## 2018-09-06 DEVICE — COMPONENT TOT HIP CAPPED BPLR MTL HD SELF CNTR: Type: IMPLANTABLE DEVICE | Site: HIP | Status: FUNCTIONAL

## 2018-09-06 DEVICE — HEAD FEM DIA28MM NK L+1.5MM HIP MTL ON MTL 12/14 TAPR: Type: IMPLANTABLE DEVICE | Site: HIP | Status: FUNCTIONAL

## 2018-09-06 DEVICE — STEM FEM SZ 6 L150MM NK L35MM 42MM OFFSET 130DEG BILAT HIP: Type: IMPLANTABLE DEVICE | Site: HIP | Status: FUNCTIONAL

## 2018-09-06 DEVICE — IMPL HIP FEM HEAD SELF CTR BIPLR 44X28MM: Type: IMPLANTABLE DEVICE | Site: HIP | Status: FUNCTIONAL

## 2018-09-06 RX ORDER — EPHEDRINE SULFATE 50 MG/ML
5 INJECTION, SOLUTION INTRAVENOUS ONCE
Status: DISCONTINUED | OUTPATIENT
Start: 2018-09-06 | End: 2018-09-06 | Stop reason: ALTCHOICE

## 2018-09-06 RX ORDER — PROPOFOL 10 MG/ML
INJECTION, EMULSION INTRAVENOUS CONTINUOUS PRN
Status: DISCONTINUED | OUTPATIENT
Start: 2018-09-06 | End: 2018-09-06 | Stop reason: SDUPTHER

## 2018-09-06 RX ORDER — FENTANYL CITRATE 50 UG/ML
INJECTION, SOLUTION INTRAMUSCULAR; INTRAVENOUS
Status: DISPENSED
Start: 2018-09-06 | End: 2018-09-06

## 2018-09-06 RX ORDER — BUPIVACAINE HYDROCHLORIDE 7.5 MG/ML
INJECTION, SOLUTION INTRASPINAL PRN
Status: DISCONTINUED | OUTPATIENT
Start: 2018-09-06 | End: 2018-09-06 | Stop reason: SDUPTHER

## 2018-09-06 RX ORDER — OXYCODONE HYDROCHLORIDE AND ACETAMINOPHEN 5; 325 MG/1; MG/1
1 TABLET ORAL EVERY 6 HOURS PRN
Qty: 28 TABLET | Refills: 0 | Status: SHIPPED | OUTPATIENT
Start: 2018-09-06 | End: 2018-09-13

## 2018-09-06 RX ORDER — ONDANSETRON 2 MG/ML
4 INJECTION INTRAMUSCULAR; INTRAVENOUS
Status: DISCONTINUED | OUTPATIENT
Start: 2018-09-06 | End: 2018-09-06 | Stop reason: HOSPADM

## 2018-09-06 RX ORDER — FENTANYL CITRATE 50 UG/ML
25 INJECTION, SOLUTION INTRAMUSCULAR; INTRAVENOUS EVERY 5 MIN PRN
Status: DISCONTINUED | OUTPATIENT
Start: 2018-09-06 | End: 2018-09-06 | Stop reason: HOSPADM

## 2018-09-06 RX ORDER — FENTANYL CITRATE 50 UG/ML
50 INJECTION, SOLUTION INTRAMUSCULAR; INTRAVENOUS EVERY 5 MIN PRN
Status: DISCONTINUED | OUTPATIENT
Start: 2018-09-06 | End: 2018-09-06 | Stop reason: HOSPADM

## 2018-09-06 RX ORDER — FENTANYL CITRATE 50 UG/ML
INJECTION, SOLUTION INTRAMUSCULAR; INTRAVENOUS PRN
Status: DISCONTINUED | OUTPATIENT
Start: 2018-09-06 | End: 2018-09-06 | Stop reason: SDUPTHER

## 2018-09-06 RX ORDER — ASPIRIN 325 MG
325 TABLET, DELAYED RELEASE (ENTERIC COATED) ORAL 2 TIMES DAILY
Qty: 56 TABLET | Refills: 0 | Status: SHIPPED | OUTPATIENT
Start: 2018-09-06 | End: 2019-09-06

## 2018-09-06 RX ORDER — SODIUM CHLORIDE 0.9 % (FLUSH) 0.9 %
10 SYRINGE (ML) INJECTION EVERY 12 HOURS SCHEDULED
Status: DISCONTINUED | OUTPATIENT
Start: 2018-09-06 | End: 2018-09-10 | Stop reason: HOSPADM

## 2018-09-06 RX ORDER — EPHEDRINE SULFATE/0.9% NACL/PF 50 MG/5 ML
SYRINGE (ML) INTRAVENOUS
Status: DISPENSED
Start: 2018-09-06 | End: 2018-09-06

## 2018-09-06 RX ORDER — ACETAMINOPHEN 325 MG/1
650 TABLET ORAL EVERY 4 HOURS PRN
Status: DISCONTINUED | OUTPATIENT
Start: 2018-09-06 | End: 2018-09-10 | Stop reason: HOSPADM

## 2018-09-06 RX ORDER — DOCUSATE SODIUM 100 MG/1
100 CAPSULE, LIQUID FILLED ORAL 2 TIMES DAILY
Status: DISCONTINUED | OUTPATIENT
Start: 2018-09-06 | End: 2018-09-10 | Stop reason: HOSPADM

## 2018-09-06 RX ORDER — SODIUM CHLORIDE, SODIUM LACTATE, POTASSIUM CHLORIDE, CALCIUM CHLORIDE 600; 310; 30; 20 MG/100ML; MG/100ML; MG/100ML; MG/100ML
INJECTION, SOLUTION INTRAVENOUS CONTINUOUS PRN
Status: DISCONTINUED | OUTPATIENT
Start: 2018-09-06 | End: 2018-09-06 | Stop reason: SDUPTHER

## 2018-09-06 RX ORDER — SODIUM CHLORIDE 0.9 % (FLUSH) 0.9 %
10 SYRINGE (ML) INJECTION PRN
Status: DISCONTINUED | OUTPATIENT
Start: 2018-09-06 | End: 2018-09-10 | Stop reason: HOSPADM

## 2018-09-06 RX ADMIN — DONEPEZIL HYDROCHLORIDE 5 MG: 5 TABLET, ORALLY DISINTEGRATING ORAL at 19:52

## 2018-09-06 RX ADMIN — DEXTROSE MONOHYDRATE 2 G: 50 INJECTION, SOLUTION INTRAVENOUS at 17:15

## 2018-09-06 RX ADMIN — LOSARTAN POTASSIUM 25 MG: 50 TABLET, FILM COATED ORAL at 08:21

## 2018-09-06 RX ADMIN — SODIUM CHLORIDE, POTASSIUM CHLORIDE, SODIUM LACTATE AND CALCIUM CHLORIDE: 600; 310; 30; 20 INJECTION, SOLUTION INTRAVENOUS at 09:18

## 2018-09-06 RX ADMIN — BUPIVACAINE HYDROCHLORIDE IN DEXTROSE 1.6 ML: 7.5 INJECTION, SOLUTION SUBARACHNOID at 09:23

## 2018-09-06 RX ADMIN — SIMVASTATIN 5 MG: 10 TABLET, FILM COATED ORAL at 19:52

## 2018-09-06 RX ADMIN — EPHEDRINE SULFATE 5 MG: 50 INJECTION INTRAMUSCULAR; INTRAVENOUS; SUBCUTANEOUS at 11:30

## 2018-09-06 RX ADMIN — METOPROLOL TARTRATE 50 MG: 50 TABLET ORAL at 19:52

## 2018-09-06 RX ADMIN — FENTANYL CITRATE 50 MCG: 50 INJECTION INTRAMUSCULAR; INTRAVENOUS at 09:05

## 2018-09-06 RX ADMIN — FENTANYL CITRATE 25 MCG: 50 INJECTION, SOLUTION INTRAMUSCULAR; INTRAVENOUS at 09:23

## 2018-09-06 RX ADMIN — PROPOFOL 50 MCG/KG/MIN: 10 INJECTION, EMULSION INTRAVENOUS at 09:24

## 2018-09-06 RX ADMIN — DOCUSATE SODIUM 100 MG: 100 CAPSULE, LIQUID FILLED ORAL at 19:53

## 2018-09-06 RX ADMIN — AMLODIPINE BESYLATE 5 MG: 5 TABLET ORAL at 08:21

## 2018-09-06 RX ADMIN — PHENYLEPHRINE HYDROCHLORIDE 100 MCG: 10 INJECTION INTRAMUSCULAR; INTRAVENOUS; SUBCUTANEOUS at 10:00

## 2018-09-06 RX ADMIN — PHENYLEPHRINE HYDROCHLORIDE 100 MCG: 10 INJECTION INTRAMUSCULAR; INTRAVENOUS; SUBCUTANEOUS at 09:27

## 2018-09-06 RX ADMIN — DEXTROSE MONOHYDRATE 2 G: 50 INJECTION, SOLUTION INTRAVENOUS at 09:18

## 2018-09-06 ASSESSMENT — PULMONARY FUNCTION TESTS
PIF_VALUE: 1
PIF_VALUE: 0
PIF_VALUE: 1
PIF_VALUE: 0
PIF_VALUE: 1
PIF_VALUE: 1
PIF_VALUE: 0
PIF_VALUE: 1
PIF_VALUE: 2
PIF_VALUE: 1
PIF_VALUE: 0
PIF_VALUE: 1
PIF_VALUE: 0
PIF_VALUE: 1
PIF_VALUE: 0
PIF_VALUE: 1
PIF_VALUE: 0
PIF_VALUE: 1
PIF_VALUE: 1
PIF_VALUE: 0
PIF_VALUE: 1
PIF_VALUE: 1
PIF_VALUE: 0
PIF_VALUE: 1
PIF_VALUE: 0
PIF_VALUE: 1
PIF_VALUE: 1
PIF_VALUE: 0
PIF_VALUE: 1
PIF_VALUE: 1
PIF_VALUE: 0
PIF_VALUE: 1
PIF_VALUE: 0
PIF_VALUE: 1

## 2018-09-06 ASSESSMENT — PAIN SCALES - GENERAL
PAINLEVEL_OUTOF10: 0
PAINLEVEL_OUTOF10: 9
PAINLEVEL_OUTOF10: 0

## 2018-09-06 NOTE — PROGRESS NOTES
Room #:   9736/2114-61  Patient Name: Minda Horta    PHYSICAL THERAPY INITIAL EVALUATION    Tentative placement recommendation: subacute   Equipment recommendation: To be determined      Plan of care: Patient will be seen   twice daily;  for therapeutic exercise, functional retraining, endurance activities, balance exercises, family and patient education. AM-PAC Basic Mobility        AM-[facility] Mobility Inpatient   How much difficulty turning over in bed?: A Lot  How much difficulty sitting down on / standing up from a chair with arms?: A Lot  How much difficulty moving from lying on back to sitting on side of bed?: A Lot  How much help from another person moving to and from a bed to a chair?: Total  How much help from another person needed to walk in hospital room?: Total  How much help from another person for climbing 3-5 steps with a railing?: Total  AM-PAC Inpatient Mobility Raw Score : 9  AM-[facility] Inpatient T-Scale Score : 30.55  Mobility Inpatient CMS 0-100% Score: 81.38  Mobility Inpatient CMS G-Code Modifier : CM    Order:  EVALUATE AND TREAT    Diagnosis/Problem list:  S/p bipolar hemiarthroplasty PWB L  1. Closed fracture of left hip, initial encounter [facility])        Patient Active Problem List   Diagnosis    Closed left hip fracture, initial encounter ([facility] [address]    Late onset Alzheimer disease    Coronary artery disease involving native coronary artery    Hypertension, uncontrolled    Mixed hyperlipidemia    Age related osteoporosis    Pleural plaque without asbestos       Past medical history:       Diagnosis Date    Coronary artery disease involving native coronary artery 9/5/2018    Dementia     Hypertension     Mixed hyperlipidemia 9/5/2018   ;      Procedure Laterality Date    HYSTERECTOMY         The admitting diagnosis and active problem list as listed above have been reviewed prior to the initiation of this evaluation.   Last time out of bed: Prior to admit     Precautions: falls , PWB

## 2018-09-06 NOTE — PROGRESS NOTES
Date:2018  Patient Name: Ileana Knight  MRN: [id number]  : 1932  ROOM #: OR POOL/NONE    Patient is unavailable for OT evaluation due to bring in the OR for an orthopedic surgery. Will attempt OT evaluation at a later time. Thank you.    Everardo Peralta OTR/L #[id number]

## 2018-09-06 NOTE — PROGRESS NOTES
Room #:   OR POOL/NONE  Date: 2018       Patient Name: Shirley Carlson  : 1932      MRN: [id number]     Patient unavailable for physical therapy eval due to in surgery. Will attempt PT evaluation at a later time. Thank you.        Karthikeyan Solis, PT

## 2018-09-06 NOTE — OP NOTE
Operative report        DATE OF PROCEDURE: 9/6/2018     SURGEON: Winifred Calvillo    ASSISTANT: Hebert Bang    PREOPERATIVE DIAGNOSIS: Displaced fracture left femoral neck    POSTOPERATIVE DIAGNOSIS: Same    OPERATION: Bipolar hemiarthroplasty left hip    ANESTHESIA: Spinal    ESTIMATED BLOOD LOSS:  382 mL    COMPLICATIONS: None    SPECIMENS: Was sent to pathology    HISTORY: The patient is a 80y.o. year old female with history of above preop diagnosis. I explained the risk, benefits, expected outcome, and alternatives to the procedure. Patient understands and is in agreement. PROCEDURE:  The patient is brought to operating room after site and side were identified. Adequate spinal anesthetic was given by anesthesia department. The patient's and placed in right lateral decubitus position on the [facility] backpack with careful axillary padding. The left hip area was prepped and draped in sterile fashion. A posterior incision was marked and then sealed the surrounding sterile drapes with Ioban. Incision was made and deepened down through subcutaneous tissues. Electrocautery was used for hemostasis throughout the procedure. The subcu tissues divided in line with the skin incision with the hip flexed about 60°. The incision was and taken through the fascia yemi and the gluteus fascia in the same line. A self-retaining retractor was used to hold this open. Electrocautery was used throughout the procedure. The short external rotators were identified and these were released from the proximal femur in a hockey stick shaped incision with a suture tape placed. This was was reflected posterior for exposure. A neck cut was made in the femur 45° long axis fingerbreadth above the lesser trochanter. Bone debris and fragment was removed and then the head was removed from the acetabulum with a corkscrew extractor. The acetabulum was then irrigated and aspirated and a trial ball was placed to determine the correct size.  40

## 2018-09-06 NOTE — PLAN OF CARE
Problem: Falls - Risk of:  Goal: Absence of physical injury  Absence of physical injury   Outcome: Met This Shift      Problem: Risk for Impaired Skin Integrity  Goal: Tissue integrity - skin and mucous membranes  Structural intactness and normal physiological function of skin and  mucous membranes.    Outcome: Ongoing

## 2018-09-06 NOTE — PROGRESS NOTES
Patient ID:  Donovan Kothari  [id number]  80 y.o.  12/21/1932    HPI:  Patient voiced no new complaints since hospital admission. Questions, answers, and tests reviewed. ROS:  Cardiovascular:   Denies any chest pain, irregular heartbeats, or palpitations. Respiratory:   Denies shortness of breath, coughing, sputum production, hemoptysis, or wheezing. Gastrointestinal:   Denies nausea, vomiting, diarrhea, or constipation. Denies any abdominal pain. Extremities:   Denies any lower extremity swelling or edema. Neurology:    Denies any headache or focal neurological deficits. No weakness or paresthesia. Derm:    Denies any rashes, ulcers, or excoriations. Denies bruising. Genitourinary:    Denies any urgency, frequency, hematuria. Voiding without difficulty. Physical Exam:    Vitals:    09/06/18 0821   BP: (!) 150/86   Pulse: 80   Resp: 16   Temp: 98.9 °F (37.2 °C)   SpO2:        HEENT:  PERRLA. EOMI. Sclera clear. Buccal mucosa moist.    Neck:  Supple. Trachea midline. No thyromegaly. No JVD. No bruits. Heart:  Rhythm regular, rate controlled. No murmurs. Lungs:  Symmetrical. Clear to auscultation bilaterally. No wheezes. No rhonchi. No rales. Abdomen: Soft. Non-tender. Non-distended. Bowel sounds positive. No organomegaly or masses. No pain on palpation    Extremities:  Peripheral pulses present. No peripheral edema. No ulcers. Neurologic:  Alert x 3. No focal deficit. Cranial nerves grossly intact. Skin:  No petechia. No hemorrhage. No wounds.     Labs:  CBC: Lab Results   Component Value Date    WBC 14.1 09/06/2018    RBC 4.33 09/06/2018    HGB 13.6 09/06/2018    HCT 41.4 09/06/2018    MCV 95.6 09/06/2018    MCH 31.4 09/06/2018    MCHC 32.9 09/06/2018    RDW 12.8 09/06/2018     09/06/2018    MPV 9.8 09/06/2018     CMP:  Lab Results   Component Value Date     09/05/2018    K 3.4 09/05/2018    CL 98 09/05/2018    CO2 32 09/05/2018    BUN 16 09/05/2018    CREATININE 09/05/2018    Mixed hyperlipidemia 09/05/2018    Age related osteoporosis 09/05/2018    Pleural plaque without asbestos 09/05/2018         Plan: Patient for left hemiarthroplasty hip today. Continue current therapy. See orders for further plan of care.     Completed By:  Dr. Fabian Saldivar MD, Miles Rogel.  8:51 AM  9/6/2018      Electronically signed by Yamilka Rodriguez MD on 9/6/18 at 8:51 AM

## 2018-09-06 NOTE — PROGRESS NOTES
P Quality Flow/Interdisciplinary Rounds Progress Note        Quality Flow Rounds held on September 6, 2018    Disciplines Attending:  Bedside Nurse, ,  and Nursing Unit Leadership    Shayla Jc was admitted on 9/5/2018  4:58 AM    Anticipated Discharge Date:  Expected Discharge Date: 09/08/18    Disposition:    Cristhian Score:  Cristhian Scale Score: 17    Readmission Risk              Risk of Unplanned Readmission:        9             Discussed patient goal for the day, patient clinical progression, and barriers to discharge.   The following Goal(s) of the Day/Commitment(s) have been identified:  OR - left hip bharat planned for today; PT/OT after OR      [facility]  September 6, 2018

## 2018-09-06 NOTE — PROGRESS NOTES
scoot up in bed. Education provided for proper positioning of lower extremity in bed. All needs within reach. Daughter present in the room. Rehab Potential: good   Plan of care: Patient will be seen by OT 1-3 times a week for therapeutic activity, ADL re-training, bed mobility, functional transfers, functional mobility, safety and fall prevention, balance and endurance activities, instruction in energy conservation principles, and patient/family education. Patient and/or family understands diagnosis, prognosis, education and plan of care. Pt/family verbalized understanding.      Time Code treatment minutes: Piedad OTR/L #[id number]

## 2018-09-07 LAB
HCT VFR BLD CALC: 37.4 % (ref 34–48)
HEMOGLOBIN: 12.1 G/DL (ref 11.5–15.5)

## 2018-09-07 PROCEDURE — 6370000000 HC RX 637 (ALT 250 FOR IP): Performed by: STUDENT IN AN ORGANIZED HEALTH CARE EDUCATION/TRAINING PROGRAM

## 2018-09-07 PROCEDURE — 36415 COLL VENOUS BLD VENIPUNCTURE: CPT

## 2018-09-07 PROCEDURE — 97535 SELF CARE MNGMENT TRAINING: CPT

## 2018-09-07 PROCEDURE — 85018 HEMOGLOBIN: CPT

## 2018-09-07 PROCEDURE — 6360000002 HC RX W HCPCS: Performed by: ORTHOPAEDIC SURGERY

## 2018-09-07 PROCEDURE — 6370000000 HC RX 637 (ALT 250 FOR IP): Performed by: ORTHOPAEDIC SURGERY

## 2018-09-07 PROCEDURE — 1200000000 HC SEMI PRIVATE

## 2018-09-07 PROCEDURE — 97110 THERAPEUTIC EXERCISES: CPT

## 2018-09-07 PROCEDURE — 2580000003 HC RX 258: Performed by: ORTHOPAEDIC SURGERY

## 2018-09-07 PROCEDURE — 6370000000 HC RX 637 (ALT 250 FOR IP): Performed by: INTERNAL MEDICINE

## 2018-09-07 PROCEDURE — 85014 HEMATOCRIT: CPT

## 2018-09-07 PROCEDURE — 6360000002 HC RX W HCPCS: Performed by: INTERNAL MEDICINE

## 2018-09-07 PROCEDURE — 97530 THERAPEUTIC ACTIVITIES: CPT

## 2018-09-07 PROCEDURE — 2500000003 HC RX 250 WO HCPCS: Performed by: INTERNAL MEDICINE

## 2018-09-07 RX ADMIN — ACETAMINOPHEN 650 MG: 325 TABLET, FILM COATED ORAL at 20:06

## 2018-09-07 RX ADMIN — METOPROLOL TARTRATE 50 MG: 50 TABLET ORAL at 09:29

## 2018-09-07 RX ADMIN — DOCUSATE SODIUM 100 MG: 100 CAPSULE, LIQUID FILLED ORAL at 09:30

## 2018-09-07 RX ADMIN — METOPROLOL TARTRATE 50 MG: 50 TABLET ORAL at 20:05

## 2018-09-07 RX ADMIN — HYDROCHLOROTHIAZIDE 6.25 MG: 12.5 TABLET ORAL at 09:30

## 2018-09-07 RX ADMIN — AMLODIPINE BESYLATE 5 MG: 5 TABLET ORAL at 09:30

## 2018-09-07 RX ADMIN — POTASSIUM CHLORIDE, DEXTROSE MONOHYDRATE AND SODIUM CHLORIDE: 150; 5; 450 INJECTION, SOLUTION INTRAVENOUS at 00:05

## 2018-09-07 RX ADMIN — DEXTROSE MONOHYDRATE 2 G: 50 INJECTION, SOLUTION INTRAVENOUS at 00:05

## 2018-09-07 RX ADMIN — ENOXAPARIN SODIUM 40 MG: 40 INJECTION SUBCUTANEOUS at 09:30

## 2018-09-07 RX ADMIN — OXYCODONE HYDROCHLORIDE 5 MG: 5 TABLET ORAL at 00:08

## 2018-09-07 RX ADMIN — DOCUSATE SODIUM 100 MG: 100 CAPSULE, LIQUID FILLED ORAL at 20:05

## 2018-09-07 RX ADMIN — DONEPEZIL HYDROCHLORIDE 5 MG: 5 TABLET, ORALLY DISINTEGRATING ORAL at 20:08

## 2018-09-07 RX ADMIN — Medication 10 ML: at 20:08

## 2018-09-07 RX ADMIN — LOSARTAN POTASSIUM 25 MG: 50 TABLET, FILM COATED ORAL at 09:30

## 2018-09-07 RX ADMIN — SIMVASTATIN 5 MG: 10 TABLET, FILM COATED ORAL at 20:06

## 2018-09-07 ASSESSMENT — PAIN DESCRIPTION - PAIN TYPE
TYPE: SURGICAL PAIN;ACUTE PAIN
TYPE: SURGICAL PAIN

## 2018-09-07 ASSESSMENT — PAIN SCALES - GENERAL
PAINLEVEL_OUTOF10: 0
PAINLEVEL_OUTOF10: 0
PAINLEVEL_OUTOF10: 2
PAINLEVEL_OUTOF10: 0
PAINLEVEL_OUTOF10: 0
PAINLEVEL_OUTOF10: 6
PAINLEVEL_OUTOF10: 3

## 2018-09-07 ASSESSMENT — PAIN DESCRIPTION - LOCATION
LOCATION: HIP
LOCATION: HIP

## 2018-09-07 ASSESSMENT — PAIN DESCRIPTION - ORIENTATION
ORIENTATION: LEFT
ORIENTATION: LEFT

## 2018-09-07 ASSESSMENT — PAIN DESCRIPTION - DESCRIPTORS
DESCRIPTORS: ACHING;DISCOMFORT;DULL
DESCRIPTORS: ACHING;CRAMPING;DULL

## 2018-09-07 ASSESSMENT — PAIN DESCRIPTION - FREQUENCY
FREQUENCY: INTERMITTENT
FREQUENCY: INTERMITTENT

## 2018-09-07 ASSESSMENT — PAIN DESCRIPTION - ONSET: ONSET: AWAKENED FROM SLEEP

## 2018-09-07 NOTE — PROGRESS NOTES
Department of Orthopedic Surgery  Resident Progress Note    Patient seen and examined. Pain controlled. No new complaints. Patient is doing much better today then yesterday. She says she feels much better.  denies Flatulence, denies BM     VITALS:  BP (!) 124/58   Pulse 80   Temp 97.9 °F (36.6 °C) (Oral)   Resp 16   Ht 5' 2\" (1.575 m)   Wt 124 lb (56.2 kg)   SpO2 97%   BMI 22.68 kg/m²     General: alert and oriented to person, place and time, well-developed and well-nourished, in no acute distress    MUSCULOSKELETAL:   left lower extremity:  · Dressing C/D/I  · Compartments soft and compressible  · +PF/DF/EHL  · +2/4 DP & PT pulses, Brisk Cap refill, Toes warm and perfused  · Distal sensation grossly intact to Peroneals, Sural, Saphenous, and tibial nrs    CBC:   Lab Results   Component Value Date    WBC 14.1 09/06/2018    HGB 12.1 09/07/2018    HCT 37.4 09/07/2018     09/06/2018     PT/INR:    Lab Results   Component Value Date    PROTIME 11.4 09/05/2018    INR 1.0 09/05/2018         ASSESSMENT  · S/P Left hip bharat-arthroplasty POD 1    PLAN      · Continue physical therapy and protocol: WBAT LLE  · 24 hour abx coverage  · Deep venous thrombosis prophylaxis - lovenox, early mobilization  · PT/OT  · Pain Control: IV and PO  · Monitor H&H 12.1 today  · D/C Plan:  Discharge planning  · Case discussed with Dr. Deepika Liriano

## 2018-09-07 NOTE — PROGRESS NOTES
V/C for technique. Pt sat on EOB for 15 minutes and stood for 2 reps. Family education/handouts: Pt's son present during tx session. Comment: Call light left by patient. RTB at end of tx session with bed alarm on. A: Pt needing encouragement and extra time for all functional mobility. Pt able to stand with assist x 2 but could not take any steps. Pt will repeat \" Wait a minute\" before all ex's and any type of movement. P: Continue with physical therapy    CATARINA LOGAN, PTA          GOALS to be met in 3 days. Bed mobility-  Minimal assists                                                Transfers-Sit to stand-Minimal assists                  Gait:  Patient to ambulate 30 feet using Wheeled Walker with Minimal assists PWB LLE                     Increase strength in affected mm groups by 1/3 grade  Increase balance to allow for improvement towards functional goals. Increase endurance to allow for improvement towards functional goals.

## 2018-09-07 NOTE — PROGRESS NOTES
Occupational Therapy  O.T. Bedside Treatment Note    Date:2018  Patient Name: Monet Barrios  MRN: [id number]  : 1932  ROOM #: [id number]/[id number]    Problem list / Diagnosis:   Patient Active Problem List   Diagnosis    Closed left hip fracture, initial encounter ([facility] [address]    Late onset Alzheimer disease    Coronary artery disease involving native coronary artery    Hypertension, uncontrolled    Mixed hyperlipidemia    Age related osteoporosis    Pleural plaque without asbestos     Past Medical History:   Past Medical History:   Diagnosis Date    Coronary artery disease involving native coronary artery 2018    Dementia     Hypertension     Mixed hyperlipidemia 2018     Onset/Medical history: medical history reviewed  Past medical history: reviewed    The admitting diagnosis and active problem list as listed above have been reviewed prior to treatment. Nursing cleared the patient for treatment. Patient is agreeable to treatment. Discharge recommendations:JACKIE  [facility]    [facility] Daily Activity Inpatient     AM-PAC Daily Activity Inpatient   How much help for putting on and taking off regular lower body clothing?: Total  How much help for Bathing?: A Lot  How much help for Toileting?: Total  How much help for putting on and taking off regular upper body clothing?: A Lot  How much help for taking care of personal grooming?: A Lot  How much help for eating meals?: A Little  AM-[facility] Inpatient Daily Activity Raw Score: 11  AM-PAC Inpatient ADL T-Scale Score : 29.04  ADL Inpatient CMS 0-100% Score: 70.42  ADL Inpatient CMS G-Code Modifier : CL    Precautions: falls, partial weight bearing: L LE, hip precautions, tele sitter     S:  - Pt cleared for treatment through nursing.  - Pain: pt c/o pain in L hip, however pt did not rate pain. Nsg aware  - Pt's son, Pauline Logan, whom she lives with present.  - Pt repeatedly stating \"wait a minute, just a minute\".    - Pt minutes:25    Chao Headley, [address]

## 2018-09-07 NOTE — PROGRESS NOTES
 Coronary artery disease involving native coronary artery 09/05/2018    Hypertension, uncontrolled 09/05/2018    Mixed hyperlipidemia 09/05/2018    Age related osteoporosis 09/05/2018    Pleural plaque without asbestos 09/05/2018         Plan: S/P Left THR,PT/OT,Stable H and H post operative. Continue current therapy. See orders for further plan of care.     Completed By:  Dr. Izzy Reinoso MD, [facility].  8:13 AM  9/7/2018      Electronically signed by Chantel Quintanilla MD on 9/7/18 at 8:13 AM

## 2018-09-07 NOTE — PROGRESS NOTES
V/C for technique. Pt sat on EOB for 15 minutes and stood for 3 reps. Family education/handouts: Pt's son present during tx session. Comment: Call light left by patient. RTB at end of tx session with bed alarm on. A: Pt needing encouragement and extra time for all functional mobility. Pt able to stand with assist x 2 but could not take any steps. Pt will repeat \" Wait a minute\" before all ex's and any type of movement. P: Continue with physical therapy    CATARINA LOGAN, PTA          GOALS to be met in 3 days. Bed mobility-  Minimal assists                                                Transfers-Sit to stand-Minimal assists                  Gait:  Patient to ambulate 30 feet using Wheeled Walker with Minimal assists PWB LLE                     Increase strength in affected mm groups by 1/3 grade  Increase balance to allow for improvement towards functional goals. Increase endurance to allow for improvement towards functional goals.

## 2018-09-08 LAB
BASOPHILS ABSOLUTE: 0.05 E9/L (ref 0–0.2)
BASOPHILS RELATIVE PERCENT: 0.5 % (ref 0–2)
EOSINOPHILS ABSOLUTE: 0.22 E9/L (ref 0.05–0.5)
EOSINOPHILS RELATIVE PERCENT: 2.4 % (ref 0–6)
HCT VFR BLD CALC: 34.3 % (ref 34–48)
HEMOGLOBIN: 11.4 G/DL (ref 11.5–15.5)
IMMATURE GRANULOCYTES #: 0.05 E9/L
IMMATURE GRANULOCYTES %: 0.5 % (ref 0–5)
LYMPHOCYTES ABSOLUTE: 1.55 E9/L (ref 1.5–4)
LYMPHOCYTES RELATIVE PERCENT: 16.8 % (ref 20–42)
MCH RBC QN AUTO: 31.2 PG (ref 26–35)
MCHC RBC AUTO-ENTMCNC: 33.2 % (ref 32–34.5)
MCV RBC AUTO: 94 FL (ref 80–99.9)
MONOCYTES ABSOLUTE: 0.7 E9/L (ref 0.1–0.95)
MONOCYTES RELATIVE PERCENT: 7.6 % (ref 2–12)
NEUTROPHILS ABSOLUTE: 6.68 E9/L (ref 1.8–7.3)
NEUTROPHILS RELATIVE PERCENT: 72.2 % (ref 43–80)
PDW BLD-RTO: 12.6 FL (ref 11.5–15)
PLATELET # BLD: 126 E9/L (ref 130–450)
PMV BLD AUTO: 10.3 FL (ref 7–12)
RBC # BLD: 3.65 E12/L (ref 3.5–5.5)
WBC # BLD: 9.3 E9/L (ref 4.5–11.5)

## 2018-09-08 PROCEDURE — 6370000000 HC RX 637 (ALT 250 FOR IP): Performed by: STUDENT IN AN ORGANIZED HEALTH CARE EDUCATION/TRAINING PROGRAM

## 2018-09-08 PROCEDURE — 1200000000 HC SEMI PRIVATE

## 2018-09-08 PROCEDURE — 51798 US URINE CAPACITY MEASURE: CPT

## 2018-09-08 PROCEDURE — 6370000000 HC RX 637 (ALT 250 FOR IP): Performed by: ORTHOPAEDIC SURGERY

## 2018-09-08 PROCEDURE — 97530 THERAPEUTIC ACTIVITIES: CPT

## 2018-09-08 PROCEDURE — 2580000003 HC RX 258: Performed by: ORTHOPAEDIC SURGERY

## 2018-09-08 PROCEDURE — 97110 THERAPEUTIC EXERCISES: CPT

## 2018-09-08 PROCEDURE — 6360000002 HC RX W HCPCS: Performed by: INTERNAL MEDICINE

## 2018-09-08 PROCEDURE — 36415 COLL VENOUS BLD VENIPUNCTURE: CPT

## 2018-09-08 PROCEDURE — 6370000000 HC RX 637 (ALT 250 FOR IP): Performed by: INTERNAL MEDICINE

## 2018-09-08 PROCEDURE — 85025 COMPLETE CBC W/AUTO DIFF WBC: CPT

## 2018-09-08 RX ADMIN — METOPROLOL TARTRATE 50 MG: 50 TABLET ORAL at 09:33

## 2018-09-08 RX ADMIN — ACETAMINOPHEN 650 MG: 325 TABLET, FILM COATED ORAL at 20:03

## 2018-09-08 RX ADMIN — SIMVASTATIN 5 MG: 10 TABLET, FILM COATED ORAL at 20:04

## 2018-09-08 RX ADMIN — ONDANSETRON 4 MG: 2 INJECTION INTRAMUSCULAR; INTRAVENOUS at 09:33

## 2018-09-08 RX ADMIN — DOCUSATE SODIUM 100 MG: 100 CAPSULE, LIQUID FILLED ORAL at 20:04

## 2018-09-08 RX ADMIN — OXYCODONE HYDROCHLORIDE 5 MG: 5 TABLET ORAL at 14:20

## 2018-09-08 RX ADMIN — DOCUSATE SODIUM 100 MG: 100 CAPSULE, LIQUID FILLED ORAL at 09:34

## 2018-09-08 RX ADMIN — LOSARTAN POTASSIUM 25 MG: 50 TABLET, FILM COATED ORAL at 09:34

## 2018-09-08 RX ADMIN — HYDROCHLOROTHIAZIDE 6.25 MG: 12.5 TABLET ORAL at 09:34

## 2018-09-08 RX ADMIN — METOPROLOL TARTRATE 50 MG: 50 TABLET ORAL at 20:04

## 2018-09-08 RX ADMIN — DONEPEZIL HYDROCHLORIDE 5 MG: 5 TABLET, ORALLY DISINTEGRATING ORAL at 20:04

## 2018-09-08 RX ADMIN — AMLODIPINE BESYLATE 5 MG: 5 TABLET ORAL at 09:33

## 2018-09-08 RX ADMIN — ENOXAPARIN SODIUM 40 MG: 40 INJECTION SUBCUTANEOUS at 09:03

## 2018-09-08 RX ADMIN — Medication 10 ML: at 20:05

## 2018-09-08 ASSESSMENT — PAIN DESCRIPTION - LOCATION: LOCATION: HIP

## 2018-09-08 ASSESSMENT — PAIN DESCRIPTION - ORIENTATION: ORIENTATION: LEFT

## 2018-09-08 ASSESSMENT — PAIN SCALES - GENERAL
PAINLEVEL_OUTOF10: 4
PAINLEVEL_OUTOF10: 0
PAINLEVEL_OUTOF10: 2
PAINLEVEL_OUTOF10: 0
PAINLEVEL_OUTOF10: 3
PAINLEVEL_OUTOF10: 8

## 2018-09-08 ASSESSMENT — PAIN DESCRIPTION - DESCRIPTORS: DESCRIPTORS: ACHING;DISCOMFORT;SORE

## 2018-09-08 ASSESSMENT — PAIN DESCRIPTION - PAIN TYPE: TYPE: SURGICAL PAIN

## 2018-09-08 NOTE — PLAN OF CARE
Problem: Risk for Impaired Skin Integrity  Goal: Tissue integrity - skin and mucous membranes  Structural intactness and normal physiological function of skin and  mucous membranes.    Outcome: Met This Shift      Problem: Pain:  Goal: Pain level will decrease  Pain level will decrease   Outcome: Met This Shift

## 2018-09-08 NOTE — PROGRESS NOTES
on EOB for 3 minutes . Family education/handouts: Pt's son present during tx session. Comment: Call light left by patient. RTB at end of tx session with bed alarm on. A: Pt's Son   needed in room to assist with encouragement for pt to allow limited time sitting at side of bed. Pt will repeat \" Wait a minute\" before all ex's and any type of movement. P: Continue with physical therapy    GOALS to be met in 3 days. Bed mobility-  Minimal assists                                                Transfers-Sit to stand-Minimal assists                  Gait:  Patient to ambulate 30 feet using Wheeled Walker with Minimal assists PWB LLE                     Increase strength in affected mm groups by 1/3 grade  Increase balance to allow for improvement towards functional goals. Increase endurance to allow for improvement towards functional goals.   Holly Lockett, PTA

## 2018-09-08 NOTE — CARE COORDINATION
SS Note/ Discharge plan:  Notified by [facility] admissions for [facility] that they received insurance precert and will be good through Monday 9/10, nursing notified, sw will follow for medical d/c and to confirm transfer arrangments. Electronically signed by CIARA Dickey on 9/8/2018 at 9:35 AM

## 2018-09-08 NOTE — PROGRESS NOTES
Department of Orthopedic Surgery  Resident Progress Note    Patient seen and examined. Pain controlled. No new complaints. Patient is doing well today. She says her pain is doing well.  + Flatulence, + BM Denies any SOB, or dizziness with PT.      VITALS:  /65   Pulse 79   Temp 99 °F (37.2 °C) (Oral)   Resp 16   Ht 5' 2\" (1.575 m)   Wt 120 lb 14.4 oz (54.8 kg)   SpO2 95%   BMI 22.11 kg/m²     General: alert and oriented to person, place and time, well-developed and well-nourished, in no acute distress    MUSCULOSKELETAL:   left lower extremity:  · Dressing C/D/I  · Compartments soft and compressible  · +PF/DF/EHL  · +2/4 DP & PT pulses, Brisk Cap refill, Toes warm and perfused  · Distal sensation grossly intact to Peroneals, Sural, Saphenous, and tibial nrs    CBC:   Lab Results   Component Value Date    WBC 9.3 09/08/2018    HGB 11.4 09/08/2018    HCT 34.3 09/08/2018     09/08/2018     PT/INR:    Lab Results   Component Value Date    PROTIME 11.4 09/05/2018    INR 1.0 09/05/2018         ASSESSMENT  · S/P Left hip bhaart-arthroplasty POD 2    PLAN      · Continue physical therapy and protocol: WBAT LLE  · Deep venous thrombosis prophylaxis - lovenox, early mobilization  · PT/OT  · Pain Control: IV and PO  · Monitor H&H 12.1 yesterday 11.4 today continue to monitor   · D/C Plan:  Discharge planning for acute rehab Sunday or Monday pending acceptance  · Case discussed with Dr. Toni Islas

## 2018-09-09 PROCEDURE — 51798 US URINE CAPACITY MEASURE: CPT

## 2018-09-09 PROCEDURE — 1200000000 HC SEMI PRIVATE

## 2018-09-09 PROCEDURE — 6360000002 HC RX W HCPCS: Performed by: INTERNAL MEDICINE

## 2018-09-09 PROCEDURE — 6370000000 HC RX 637 (ALT 250 FOR IP): Performed by: INTERNAL MEDICINE

## 2018-09-09 PROCEDURE — 6370000000 HC RX 637 (ALT 250 FOR IP): Performed by: STUDENT IN AN ORGANIZED HEALTH CARE EDUCATION/TRAINING PROGRAM

## 2018-09-09 PROCEDURE — 97530 THERAPEUTIC ACTIVITIES: CPT

## 2018-09-09 PROCEDURE — 6370000000 HC RX 637 (ALT 250 FOR IP): Performed by: ORTHOPAEDIC SURGERY

## 2018-09-09 PROCEDURE — 2580000003 HC RX 258: Performed by: ORTHOPAEDIC SURGERY

## 2018-09-09 PROCEDURE — 97110 THERAPEUTIC EXERCISES: CPT

## 2018-09-09 RX ADMIN — HYDROCHLOROTHIAZIDE 6.25 MG: 12.5 TABLET ORAL at 08:08

## 2018-09-09 RX ADMIN — METOPROLOL TARTRATE 50 MG: 50 TABLET ORAL at 20:53

## 2018-09-09 RX ADMIN — METOPROLOL TARTRATE 50 MG: 50 TABLET ORAL at 08:08

## 2018-09-09 RX ADMIN — SIMVASTATIN 5 MG: 10 TABLET, FILM COATED ORAL at 20:53

## 2018-09-09 RX ADMIN — OXYCODONE HYDROCHLORIDE 5 MG: 5 TABLET ORAL at 12:21

## 2018-09-09 RX ADMIN — ENOXAPARIN SODIUM 40 MG: 40 INJECTION SUBCUTANEOUS at 08:30

## 2018-09-09 RX ADMIN — ACETAMINOPHEN 650 MG: 325 TABLET, FILM COATED ORAL at 20:53

## 2018-09-09 RX ADMIN — AMLODIPINE BESYLATE 5 MG: 5 TABLET ORAL at 08:09

## 2018-09-09 RX ADMIN — OXYCODONE HYDROCHLORIDE 5 MG: 5 TABLET ORAL at 01:12

## 2018-09-09 RX ADMIN — DONEPEZIL HYDROCHLORIDE 5 MG: 5 TABLET, ORALLY DISINTEGRATING ORAL at 20:53

## 2018-09-09 RX ADMIN — LOSARTAN POTASSIUM 25 MG: 50 TABLET, FILM COATED ORAL at 08:03

## 2018-09-09 RX ADMIN — DOCUSATE SODIUM 100 MG: 100 CAPSULE, LIQUID FILLED ORAL at 20:53

## 2018-09-09 RX ADMIN — DOCUSATE SODIUM 100 MG: 100 CAPSULE, LIQUID FILLED ORAL at 08:08

## 2018-09-09 RX ADMIN — Medication 10 ML: at 20:54

## 2018-09-09 ASSESSMENT — PAIN SCALES - GENERAL
PAINLEVEL_OUTOF10: 7
PAINLEVEL_OUTOF10: 3
PAINLEVEL_OUTOF10: 0
PAINLEVEL_OUTOF10: 1
PAINLEVEL_OUTOF10: 0
PAINLEVEL_OUTOF10: 6
PAINLEVEL_OUTOF10: 3

## 2018-09-09 ASSESSMENT — PAIN DESCRIPTION - ONSET: ONSET: ON-GOING

## 2018-09-09 ASSESSMENT — PAIN DESCRIPTION - FREQUENCY: FREQUENCY: INTERMITTENT

## 2018-09-09 ASSESSMENT — PAIN DESCRIPTION - ORIENTATION: ORIENTATION: LEFT

## 2018-09-09 ASSESSMENT — PAIN DESCRIPTION - LOCATION: LOCATION: HIP

## 2018-09-09 ASSESSMENT — PAIN DESCRIPTION - PAIN TYPE: TYPE: ACUTE PAIN

## 2018-09-09 ASSESSMENT — PAIN DESCRIPTION - DESCRIPTORS: DESCRIPTORS: ACHING

## 2018-09-09 NOTE — PROGRESS NOTES
uncontrolled 09/05/2018    Mixed hyperlipidemia 09/05/2018    Age related osteoporosis 09/05/2018    Pleural plaque without asbestos 09/05/2018         Plan: Probable transfer to nursing home tomorrow. Continue current therapy. See orders for further plan of care. Completed By:  Dr. Odell Nance MD, [address].   12:51 PM  9/9/2018      Electronically signed by Suzan Cox MD on 9/9/18 at 12:51 PM

## 2018-09-09 NOTE — PROGRESS NOTES
Patient seen for follow-up of podiatric care. Patient has no new complaints. Patient states her knee feels better since nails were debrided. Her hip is improving. No other complaints at this time. Examination:  Patient's neurovascular status is grossly intact unchanged from the 1st visit. Nails were debrided ×10. No sign of infection from her seizure. Assessment: #1 onychomycosis, #2 PVD  Plan: #1 brief hospital visit  Follow-up as needed.   We'll recommend follow-up care in my office for continued nail and footcare if patient does not go to a long term care facility

## 2018-09-09 NOTE — PROGRESS NOTES
Physical Therapy  PHYSICAL THERAPY  TREATMENT NOTE    9/9/2018  0314/0314-02                      Patient Name: Enriqueta Cowart      [id number]                              12/21/1932     Recommendation for discharge: Subacute  Equipment prescriptions needed: to be determined    AM-PAC Mobility Inpatient   How much difficulty turning over in bed?: A Lot  How much difficulty sitting down on / standing up from a chair with arms?: A Lot  How much difficulty moving from lying on back to sitting on side of bed?: A Lot  How much help from another person moving to and from a bed to a chair?: A Lot  How much help from another person needed to walk in hospital room?: Total  How much help from another person for climbing 3-5 steps with a railing?: Total  AM-PAC Inpatient Mobility Raw Score : 10  AM-PAC Inpatient T-Scale Score : 32.29  Mobility Inpatient CMS 0-100% Score: 76.75  Mobility Inpatient CMS G-Code Modifier : CL    Patient Active Problem List   Diagnosis    Closed left hip fracture, initial encounter ([facility] [address]    Late onset Alzheimer disease    Coronary artery disease involving native coronary artery    Hypertension, uncontrolled    Mixed hyperlipidemia    Age related osteoporosis    Pleural plaque without asbestos         Weight bearing/Precautions: falls, alarm, telesitter, dementia, LLE: PWB, hip precautions     S: Patient cleared by nursing for treatment. Patient is agreeable to treatment. Pt c/o pain with her L hip. Pt stated multiple times that she is located in a restaurant and is waiting for son to come back from paying bill; attempted to redirect patient to current state, however, patient would become agitated. Pain status: (measured on a visual analog scale with 0=no pain and 10=excruciating pain) no number given/10.    O: Pt was instructed in and performed the following:   Bed Mobility-  Supine to sit- Maximal assistance x 2      Scooting- Maximal assistance x 2      Sit to supine- Maximal assistance x 2 Transfers-sit to stand- Moderate assistance x 2    Gait: NA   Steps: NA  Treatment: Pt performed supine ankle pumps, quad sets, heel slides, hip abduction, and SLR x 15 reps. V/C were needed for technique. Pt was transferred to side of bed and sat up x 5 minutes with minimal assist required to maintain midline. Pt stood x 1 rep and then requested to RTB d/t pain. Family education/handouts: No family present during tx session. Comment: Call light left by patient. Pt RTB at end of treatment, alarm activated. A: Pt demo'd confusion throughout treatment where patient required increased time to complete tasks and to redirect. Pt con't to require assist of two d/t patient resisting movement because of pain. Pt at risk of falls. P: Continue with physical therapy    GOALS to be met in 3 days. Bed mobility- Minimal assist                                               Transfers-Sit to stand- Minimal assist                 Gait: Patient to ambulate 30 feet using Wheeled Walker with Minimal assists PWB LLE                 Increase strength in affected mm groups by 1/3 grade  Increase balance to allow for improvement towards functional goals. Increase endurance to allow for improvement towards functional goals.   [address], [facility]

## 2018-09-10 VITALS
HEIGHT: 62 IN | DIASTOLIC BLOOD PRESSURE: 76 MMHG | BODY MASS INDEX: 22.08 KG/M2 | RESPIRATION RATE: 18 BRPM | WEIGHT: 120 LBS | TEMPERATURE: 98.2 F | HEART RATE: 88 BPM | OXYGEN SATURATION: 93 % | SYSTOLIC BLOOD PRESSURE: 124 MMHG

## 2018-09-10 PROCEDURE — 97110 THERAPEUTIC EXERCISES: CPT

## 2018-09-10 PROCEDURE — 6370000000 HC RX 637 (ALT 250 FOR IP): Performed by: STUDENT IN AN ORGANIZED HEALTH CARE EDUCATION/TRAINING PROGRAM

## 2018-09-10 PROCEDURE — 6370000000 HC RX 637 (ALT 250 FOR IP): Performed by: INTERNAL MEDICINE

## 2018-09-10 PROCEDURE — 97530 THERAPEUTIC ACTIVITIES: CPT

## 2018-09-10 PROCEDURE — 6360000002 HC RX W HCPCS: Performed by: INTERNAL MEDICINE

## 2018-09-10 PROCEDURE — 6370000000 HC RX 637 (ALT 250 FOR IP): Performed by: ORTHOPAEDIC SURGERY

## 2018-09-10 RX ORDER — OMEPRAZOLE 20 MG/1
20 CAPSULE, DELAYED RELEASE ORAL 2 TIMES DAILY
Qty: 30 CAPSULE | Refills: 3
Start: 2018-09-10

## 2018-09-10 RX ORDER — AMLODIPINE BESYLATE 5 MG/1
5 TABLET ORAL DAILY
Qty: 30 TABLET | Refills: 3 | Status: SHIPPED | OUTPATIENT
Start: 2018-09-10

## 2018-09-10 RX ADMIN — AMLODIPINE BESYLATE 5 MG: 5 TABLET ORAL at 09:49

## 2018-09-10 RX ADMIN — HYDROCHLOROTHIAZIDE 6.25 MG: 12.5 TABLET ORAL at 09:48

## 2018-09-10 RX ADMIN — OXYCODONE HYDROCHLORIDE 5 MG: 5 TABLET ORAL at 00:54

## 2018-09-10 RX ADMIN — DOCUSATE SODIUM 100 MG: 100 CAPSULE, LIQUID FILLED ORAL at 09:48

## 2018-09-10 RX ADMIN — METOPROLOL TARTRATE 50 MG: 50 TABLET ORAL at 09:48

## 2018-09-10 RX ADMIN — LOSARTAN POTASSIUM 25 MG: 50 TABLET, FILM COATED ORAL at 09:49

## 2018-09-10 RX ADMIN — ENOXAPARIN SODIUM 40 MG: 40 INJECTION SUBCUTANEOUS at 09:49

## 2018-09-10 ASSESSMENT — PAIN SCALES - GENERAL
PAINLEVEL_OUTOF10: 0
PAINLEVEL_OUTOF10: 3
PAINLEVEL_OUTOF10: 5

## 2018-09-10 ASSESSMENT — PAIN DESCRIPTION - ORIENTATION: ORIENTATION: LEFT

## 2018-09-10 ASSESSMENT — PAIN DESCRIPTION - PROGRESSION: CLINICAL_PROGRESSION: NOT CHANGED

## 2018-09-10 ASSESSMENT — PAIN DESCRIPTION - LOCATION: LOCATION: HIP

## 2018-09-10 ASSESSMENT — PAIN DESCRIPTION - DESCRIPTORS: DESCRIPTORS: ACHING

## 2018-09-10 ASSESSMENT — PAIN DESCRIPTION - PAIN TYPE: TYPE: ACUTE PAIN;SURGICAL PAIN

## 2018-09-10 ASSESSMENT — PAIN DESCRIPTION - FREQUENCY: FREQUENCY: CONTINUOUS

## 2018-09-10 NOTE — PROGRESS NOTES
minutes. Family education/handouts: Pt's son present during tx session. Comment: Call light left by patient. Pt RTB at end of treatment, alarm activated. A: Pt able to sit on EOB with Minimal assist for sitting balance d/t posterior lean. Pt did not want to try and stand up this am. Pt at risk of falls. P: Continue with physical therapy    GOALS to be met in 3 days. Bed mobility- Minimal assist                                               Transfers-Sit to stand- Minimal assist                 Gait: Patient to ambulate 30 feet using Wheeled Walker with Minimal assists PWB LLE                 Increase strength in affected mm groups by 1/3 grade  Increase balance to allow for improvement towards functional goals. Increase endurance to allow for improvement towards functional goals.   CATARINA LOGAN, PTA

## 2018-09-10 NOTE — DISCHARGE SUMMARY
Views)    Result Date: 9/5/2018  Location: 200 Indication: Pain. Comparison: None available. Technique: 3 views of the left hip and pelvis were obtained. Findings: There is diffuse demineralization of osseous structures. There is a displaced left transcervical femoral neck fracture with increased varus angulation. The obturator rings are intact. Bilateral hip joint spaces are preserved. There is no evidence of avascular necrosis. Bilateral sacroiliac joints and the pubic symphysis are maintained. Displaced left transcervical femoral neck fracture with increased varus angulation. Xr Femur Left (min 2 Views)    Result Date: 9/5/2018  Location: 200 Indication: Pain. Comparison: Left hip radiograph from 9/5/2018. Technique: 4 views of the left femur were obtained. Findings: There is diffuse demineralization of osseous structures. Redemonstrated is a displaced left transcervical femoral neck fracture with increased varus angulation. The knee joint is grossly maintained. Scattered vascular calcifications are noted within the soft tissues. Displaced left transcervical femoral neck fracture with increased varus angulation. Xr Chest 1 Vw    Result Date: 9/5/2018  Reading location: 200 Indication: Preoperative clearance, hip fracture. Comparison: Chest radiograph from 6/12/2011. Technique: Portable supine chest radiograph was obtained. Findings: The cardiomediastinal silhouette is stable in size and contours. Bilateral lungs are hyperinflated. No focal consolidation. There is chronic blunting of the left costophrenic angle. The right costophrenic angle is clear. There is no evidence of pneumothorax. 1. No acute cardiopulmonary disease. 2. Hyperinflated lungs, suggestive of underlying emphysematous changes. 3. Chronic blunting of the right costophrenic angle, likely related to pleural parenchymal scarring. Treatments: Patient is a left hip surgery, physical therapy.     Discharge Exam: Patient awake and

## (undated) DEVICE — SOLUTION IV IRRIG POUR BRL 0.9% SODIUM CHL 2F7124

## (undated) DEVICE — ELECTRODE PT RET AD L9FT HI MOIST COND ADH HYDRGEL CORDED

## (undated) DEVICE — 3M™ STERI-DRAPE™ U-DRAPE 1015: Brand: STERI-DRAPE™

## (undated) DEVICE — SYRINGE IRRIG 60ML SFT PLIABLE BLB EZ TO GRP 1 HND USE W/

## (undated) DEVICE — DOUBLE BASIN SET: Brand: MEDLINE INDUSTRIES, INC.

## (undated) DEVICE — SPONGE,LAP,12"X12",XR,ST,5/PK,40PK/CS: Brand: MEDLINE

## (undated) DEVICE — Z DISCONTINUED NO SUB IDED GLOVE SURG BEAD CUF 8 STD PF WHT STRL TRIUMPH LT LTX

## (undated) DEVICE — PENCIL ES L3M BTTN SWCH HOLSTER W/ BLDE ELECTRD EDGE

## (undated) DEVICE — BANDAGE COMPR W6INXL5YD SELF ADH COHESIVE CO FLX

## (undated) DEVICE — COVER,TABLE,60X90,STERILE: Brand: MEDLINE

## (undated) DEVICE — 3M™ IOBAN™ 2 ANTIMICROBIAL INCISE DRAPE 6651EZ: Brand: IOBAN™ 2

## (undated) DEVICE — TUBING, SUCTION, 1/4" X 10', STRAIGHT: Brand: MEDLINE

## (undated) DEVICE — MEDI-VAC YANKAUER SUCTION HANDLE: Brand: CARDINAL HEALTH

## (undated) DEVICE — SET MAJOR INSTR ORTHO

## (undated) DEVICE — GRADUATE

## (undated) DEVICE — GOWN,SIRUS,FABRNF,XL,20/CS: Brand: MEDLINE

## (undated) DEVICE — READY WET SKIN SCRUB TRAY-LF: Brand: MEDLINE INDUSTRIES, INC.

## (undated) DEVICE — SHEET DRAPE FULL 70X100

## (undated) DEVICE — Z DISCONTINUED USE 2275686 GLOVE SURG SZ 8 L12IN FNGR THK13MIL WHT ISOLEX POLYISOPRENE

## (undated) DEVICE — INTENDED FOR TISSUE SEPARATION, AND OTHER PROCEDURES THAT REQUIRE A SHARP SURGICAL BLADE TO PUNCTURE OR CUT.: Brand: BARD-PARKER ® STAINLESS STEEL BLADES

## (undated) DEVICE — GAUZE,SPONGE,4"X4",16PLY,STRL,LF,10/TRAY: Brand: MEDLINE

## (undated) DEVICE — GOWN,SIRUS,POLYRNF,BRTHSLV,XLN/XL,20/CS: Brand: MEDLINE

## (undated) DEVICE — BLADE ES L6IN ELASTOMERIC COAT EXT DURABLE BEND UPTO 90DEG

## (undated) DEVICE — TOWEL,OR,DSP,ST,BLUE,STD,6/PK,12PK/CS: Brand: MEDLINE

## (undated) DEVICE — NDL CNTR 40CT FM MAG: Brand: MEDLINE INDUSTRIES, INC.

## (undated) DEVICE — MARKER,SKIN,WI/RULER AND LABELS: Brand: MEDLINE

## (undated) DEVICE — PACK,ORTHOPEDIC III,AURORA: Brand: MEDLINE

## (undated) DEVICE — SHEET SUPPORT

## (undated) DEVICE — Device